# Patient Record
Sex: FEMALE | Race: BLACK OR AFRICAN AMERICAN | NOT HISPANIC OR LATINO | Employment: UNEMPLOYED | URBAN - METROPOLITAN AREA
[De-identification: names, ages, dates, MRNs, and addresses within clinical notes are randomized per-mention and may not be internally consistent; named-entity substitution may affect disease eponyms.]

---

## 2021-11-10 ENCOUNTER — HOSPITAL ENCOUNTER (INPATIENT)
Facility: CLINIC | Age: 21
LOS: 6 days | Discharge: HOME | End: 2021-11-17
Attending: EMERGENCY MEDICINE | Admitting: SURGERY
Payer: COMMERCIAL

## 2021-11-10 DIAGNOSIS — Z20.822 LAB TEST NEGATIVE FOR COVID-19 VIRUS: ICD-10-CM

## 2021-11-10 DIAGNOSIS — R10.9 POSTOPERATIVE ABDOMINAL PAIN: ICD-10-CM

## 2021-11-10 DIAGNOSIS — G89.18 POSTOPERATIVE ABDOMINAL PAIN: ICD-10-CM

## 2021-11-10 PROCEDURE — 96361 HYDRATE IV INFUSION ADD-ON: CPT | Performed by: EMERGENCY MEDICINE

## 2021-11-10 PROCEDURE — 99285 EMERGENCY DEPT VISIT HI MDM: CPT | Mod: 25 | Performed by: EMERGENCY MEDICINE

## 2021-11-10 PROCEDURE — 96374 THER/PROPH/DIAG INJ IV PUSH: CPT | Performed by: EMERGENCY MEDICINE

## 2021-11-10 PROCEDURE — 96375 TX/PRO/DX INJ NEW DRUG ADDON: CPT | Performed by: EMERGENCY MEDICINE

## 2021-11-10 PROCEDURE — 96376 TX/PRO/DX INJ SAME DRUG ADON: CPT | Performed by: EMERGENCY MEDICINE

## 2021-11-10 PROCEDURE — C9803 HOPD COVID-19 SPEC COLLECT: HCPCS | Performed by: EMERGENCY MEDICINE

## 2021-11-10 PROCEDURE — 99284 EMERGENCY DEPT VISIT MOD MDM: CPT | Performed by: EMERGENCY MEDICINE

## 2021-11-10 RX ORDER — HYDROCODONE BITARTRATE AND ACETAMINOPHEN 5; 325 MG/1; MG/1
1 TABLET ORAL EVERY 6 HOURS PRN
COMMUNITY

## 2021-11-10 ASSESSMENT — MIFFLIN-ST. JEOR: SCORE: 1686.22

## 2021-11-11 ENCOUNTER — APPOINTMENT (OUTPATIENT)
Dept: CT IMAGING | Facility: CLINIC | Age: 21
End: 2021-11-11
Attending: EMERGENCY MEDICINE
Payer: COMMERCIAL

## 2021-11-11 ENCOUNTER — APPOINTMENT (OUTPATIENT)
Dept: GENERAL RADIOLOGY | Facility: CLINIC | Age: 21
End: 2021-11-11
Attending: EMERGENCY MEDICINE
Payer: COMMERCIAL

## 2021-11-11 PROBLEM — R10.9 POSTOPERATIVE ABDOMINAL PAIN: Status: ACTIVE | Noted: 2021-11-11

## 2021-11-11 PROBLEM — G89.18 POSTOPERATIVE ABDOMINAL PAIN: Status: ACTIVE | Noted: 2021-11-11

## 2021-11-11 PROBLEM — Z20.822 LAB TEST NEGATIVE FOR COVID-19 VIRUS: Status: ACTIVE | Noted: 2021-11-11

## 2021-11-11 LAB
ALBUMIN SERPL-MCNC: 2.8 G/DL (ref 3.4–5)
ALBUMIN UR-MCNC: 200 MG/DL
ALP SERPL-CCNC: 95 U/L (ref 40–150)
ALT SERPL W P-5'-P-CCNC: 64 U/L (ref 0–50)
ANION GAP SERPL CALCULATED.3IONS-SCNC: 5 MMOL/L (ref 3–14)
APPEARANCE UR: ABNORMAL
AST SERPL W P-5'-P-CCNC: 34 U/L (ref 0–45)
ATRIAL RATE - MUSE: 76 BPM
BASOPHILS # BLD AUTO: 0 10E3/UL (ref 0–0.2)
BASOPHILS NFR BLD AUTO: 0 %
BILIRUB SERPL-MCNC: 0.4 MG/DL (ref 0.2–1.3)
BILIRUB UR QL STRIP: NEGATIVE
BUN SERPL-MCNC: 7 MG/DL (ref 7–30)
CALCIUM SERPL-MCNC: 8.8 MG/DL (ref 8.5–10.1)
CHLORIDE BLD-SCNC: 105 MMOL/L (ref 94–109)
CO2 SERPL-SCNC: 28 MMOL/L (ref 20–32)
COLOR UR AUTO: ABNORMAL
CREAT SERPL-MCNC: 0.74 MG/DL (ref 0.52–1.04)
DIASTOLIC BLOOD PRESSURE - MUSE: NORMAL MMHG
EOSINOPHIL # BLD AUTO: 0 10E3/UL (ref 0–0.7)
EOSINOPHIL NFR BLD AUTO: 0 %
ERYTHROCYTE [DISTWIDTH] IN BLOOD BY AUTOMATED COUNT: 13.9 % (ref 10–15)
GFR SERPL CREATININE-BSD FRML MDRD: >90 ML/MIN/1.73M2
GLUCOSE BLD-MCNC: 104 MG/DL (ref 70–99)
GLUCOSE UR STRIP-MCNC: NEGATIVE MG/DL
HCG SERPL QL: NEGATIVE
HCT VFR BLD AUTO: 37.3 % (ref 35–47)
HGB BLD-MCNC: 12 G/DL (ref 11.7–15.7)
HGB UR QL STRIP: ABNORMAL
IMM GRANULOCYTES # BLD: 0 10E3/UL
IMM GRANULOCYTES NFR BLD: 0 %
INTERPRETATION ECG - MUSE: NORMAL
KETONES UR STRIP-MCNC: 100 MG/DL
LEUKOCYTE ESTERASE UR QL STRIP: ABNORMAL
LIPASE SERPL-CCNC: 232 U/L (ref 73–393)
LYMPHOCYTES # BLD AUTO: 1.5 10E3/UL (ref 0.8–5.3)
LYMPHOCYTES NFR BLD AUTO: 12 %
MCH RBC QN AUTO: 23.7 PG (ref 26.5–33)
MCHC RBC AUTO-ENTMCNC: 32.2 G/DL (ref 31.5–36.5)
MCV RBC AUTO: 74 FL (ref 78–100)
MONOCYTES # BLD AUTO: 0.9 10E3/UL (ref 0–1.3)
MONOCYTES NFR BLD AUTO: 7 %
MUCOUS THREADS #/AREA URNS LPF: PRESENT /LPF
NEUTROPHILS # BLD AUTO: 10 10E3/UL (ref 1.6–8.3)
NEUTROPHILS NFR BLD AUTO: 81 %
NITRATE UR QL: NEGATIVE
NRBC # BLD AUTO: 0 10E3/UL
NRBC BLD AUTO-RTO: 0 /100
P AXIS - MUSE: 68 DEGREES
PH UR STRIP: 6 [PH] (ref 5–7)
PLATELET # BLD AUTO: 385 10E3/UL (ref 150–450)
POTASSIUM BLD-SCNC: 4 MMOL/L (ref 3.4–5.3)
PR INTERVAL - MUSE: 164 MS
PROT SERPL-MCNC: 7.7 G/DL (ref 6.8–8.8)
QRS DURATION - MUSE: 84 MS
QT - MUSE: 384 MS
QTC - MUSE: 432 MS
R AXIS - MUSE: 59 DEGREES
RBC # BLD AUTO: 5.06 10E6/UL (ref 3.8–5.2)
RBC URINE: >182 /HPF
SARS-COV-2 RNA RESP QL NAA+PROBE: NEGATIVE
SODIUM SERPL-SCNC: 138 MMOL/L (ref 133–144)
SP GR UR STRIP: >1.05 (ref 1–1.03)
SQUAMOUS EPITHELIAL: 51 /HPF
SYSTOLIC BLOOD PRESSURE - MUSE: NORMAL MMHG
T AXIS - MUSE: 35 DEGREES
UROBILINOGEN UR STRIP-MCNC: NORMAL MG/DL
VENTRICULAR RATE- MUSE: 76 BPM
WBC # BLD AUTO: 12.4 10E3/UL (ref 4–11)
WBC URINE: >182 /HPF

## 2021-11-11 PROCEDURE — 36415 COLL VENOUS BLD VENIPUNCTURE: CPT | Performed by: EMERGENCY MEDICINE

## 2021-11-11 PROCEDURE — 250N000011 HC RX IP 250 OP 636: Performed by: EMERGENCY MEDICINE

## 2021-11-11 PROCEDURE — 83690 ASSAY OF LIPASE: CPT | Performed by: EMERGENCY MEDICINE

## 2021-11-11 PROCEDURE — 74177 CT ABD & PELVIS W/CONTRAST: CPT

## 2021-11-11 PROCEDURE — 120N000002 HC R&B MED SURG/OB UMMC

## 2021-11-11 PROCEDURE — 71045 X-RAY EXAM CHEST 1 VIEW: CPT | Mod: 26 | Performed by: RADIOLOGY

## 2021-11-11 PROCEDURE — 250N000013 HC RX MED GY IP 250 OP 250 PS 637

## 2021-11-11 PROCEDURE — U0005 INFEC AGEN DETEC AMPLI PROBE: HCPCS | Performed by: EMERGENCY MEDICINE

## 2021-11-11 PROCEDURE — 71045 X-RAY EXAM CHEST 1 VIEW: CPT

## 2021-11-11 PROCEDURE — 250N000009 HC RX 250: Performed by: EMERGENCY MEDICINE

## 2021-11-11 PROCEDURE — G0378 HOSPITAL OBSERVATION PER HR: HCPCS

## 2021-11-11 PROCEDURE — 84703 CHORIONIC GONADOTROPIN ASSAY: CPT | Performed by: EMERGENCY MEDICINE

## 2021-11-11 PROCEDURE — 99222 1ST HOSP IP/OBS MODERATE 55: CPT | Mod: GC | Performed by: SURGERY

## 2021-11-11 PROCEDURE — 74177 CT ABD & PELVIS W/CONTRAST: CPT | Mod: 26 | Performed by: RADIOLOGY

## 2021-11-11 PROCEDURE — 258N000003 HC RX IP 258 OP 636: Performed by: EMERGENCY MEDICINE

## 2021-11-11 PROCEDURE — 87086 URINE CULTURE/COLONY COUNT: CPT | Performed by: EMERGENCY MEDICINE

## 2021-11-11 PROCEDURE — 85025 COMPLETE CBC W/AUTO DIFF WBC: CPT | Performed by: EMERGENCY MEDICINE

## 2021-11-11 PROCEDURE — 80053 COMPREHEN METABOLIC PANEL: CPT | Performed by: EMERGENCY MEDICINE

## 2021-11-11 PROCEDURE — 81001 URINALYSIS AUTO W/SCOPE: CPT | Performed by: EMERGENCY MEDICINE

## 2021-11-11 RX ORDER — ONDANSETRON 2 MG/ML
4 INJECTION INTRAMUSCULAR; INTRAVENOUS EVERY 6 HOURS PRN
Status: DISCONTINUED | OUTPATIENT
Start: 2021-11-11 | End: 2021-11-17 | Stop reason: HOSPADM

## 2021-11-11 RX ORDER — ACETAMINOPHEN 325 MG/1
325 TABLET ORAL EVERY 4 HOURS PRN
Status: DISCONTINUED | OUTPATIENT
Start: 2021-11-11 | End: 2021-11-11

## 2021-11-11 RX ORDER — ONDANSETRON 4 MG/1
4 TABLET, ORALLY DISINTEGRATING ORAL EVERY 6 HOURS PRN
Status: DISCONTINUED | OUTPATIENT
Start: 2021-11-11 | End: 2021-11-17 | Stop reason: HOSPADM

## 2021-11-11 RX ORDER — OXYCODONE HYDROCHLORIDE 5 MG/1
5 TABLET ORAL EVERY 4 HOURS PRN
Status: DISCONTINUED | OUTPATIENT
Start: 2021-11-11 | End: 2021-11-12

## 2021-11-11 RX ORDER — PROCHLORPERAZINE MALEATE 5 MG
10 TABLET ORAL EVERY 6 HOURS PRN
Status: DISCONTINUED | OUTPATIENT
Start: 2021-11-11 | End: 2021-11-17 | Stop reason: HOSPADM

## 2021-11-11 RX ORDER — PROCHLORPERAZINE 25 MG
25 SUPPOSITORY, RECTAL RECTAL EVERY 12 HOURS PRN
Status: DISCONTINUED | OUTPATIENT
Start: 2021-11-11 | End: 2021-11-17 | Stop reason: HOSPADM

## 2021-11-11 RX ORDER — ACETAMINOPHEN 325 MG/1
650 TABLET ORAL EVERY 4 HOURS PRN
Status: DISCONTINUED | OUTPATIENT
Start: 2021-11-11 | End: 2021-11-12

## 2021-11-11 RX ORDER — HYDROMORPHONE HYDROCHLORIDE 1 MG/ML
0.5 INJECTION, SOLUTION INTRAMUSCULAR; INTRAVENOUS; SUBCUTANEOUS
Status: COMPLETED | OUTPATIENT
Start: 2021-11-11 | End: 2021-11-11

## 2021-11-11 RX ORDER — LIDOCAINE 40 MG/G
CREAM TOPICAL
Status: DISCONTINUED | OUTPATIENT
Start: 2021-11-11 | End: 2021-11-17 | Stop reason: HOSPADM

## 2021-11-11 RX ORDER — SODIUM CHLORIDE 9 MG/ML
INJECTION, SOLUTION INTRAVENOUS CONTINUOUS
Status: DISCONTINUED | OUTPATIENT
Start: 2021-11-11 | End: 2021-11-13

## 2021-11-11 RX ORDER — SODIUM CHLORIDE, SODIUM LACTATE, POTASSIUM CHLORIDE, CALCIUM CHLORIDE 600; 310; 30; 20 MG/100ML; MG/100ML; MG/100ML; MG/100ML
INJECTION, SOLUTION INTRAVENOUS CONTINUOUS
Status: DISCONTINUED | OUTPATIENT
Start: 2021-11-11 | End: 2021-11-11

## 2021-11-11 RX ORDER — ONDANSETRON 2 MG/ML
4 INJECTION INTRAMUSCULAR; INTRAVENOUS EVERY 30 MIN PRN
Status: DISCONTINUED | OUTPATIENT
Start: 2021-11-11 | End: 2021-11-17 | Stop reason: HOSPADM

## 2021-11-11 RX ORDER — IOPAMIDOL 755 MG/ML
120 INJECTION, SOLUTION INTRAVASCULAR ONCE
Status: COMPLETED | OUTPATIENT
Start: 2021-11-11 | End: 2021-11-11

## 2021-11-11 RX ADMIN — SODIUM CHLORIDE: 9 INJECTION, SOLUTION INTRAVENOUS at 14:11

## 2021-11-11 RX ADMIN — OXYCODONE HYDROCHLORIDE 5 MG: 5 TABLET ORAL at 20:51

## 2021-11-11 RX ADMIN — SODIUM CHLORIDE 1000 ML: 9 INJECTION, SOLUTION INTRAVENOUS at 00:31

## 2021-11-11 RX ADMIN — SODIUM CHLORIDE: 9 INJECTION, SOLUTION INTRAVENOUS at 22:03

## 2021-11-11 RX ADMIN — HYDROMORPHONE HYDROCHLORIDE 0.5 MG: 1 INJECTION, SOLUTION INTRAMUSCULAR; INTRAVENOUS; SUBCUTANEOUS at 01:43

## 2021-11-11 RX ADMIN — ACETAMINOPHEN 650 MG: 325 TABLET, FILM COATED ORAL at 16:26

## 2021-11-11 RX ADMIN — HYDROMORPHONE HYDROCHLORIDE 0.5 MG: 1 INJECTION, SOLUTION INTRAMUSCULAR; INTRAVENOUS; SUBCUTANEOUS at 00:26

## 2021-11-11 RX ADMIN — HYDROMORPHONE HYDROCHLORIDE 0.5 MG: 1 INJECTION, SOLUTION INTRAMUSCULAR; INTRAVENOUS; SUBCUTANEOUS at 06:15

## 2021-11-11 RX ADMIN — OXYCODONE HYDROCHLORIDE 5 MG: 5 TABLET ORAL at 13:18

## 2021-11-11 RX ADMIN — SODIUM CHLORIDE, PRESERVATIVE FREE 79 ML: 5 INJECTION INTRAVENOUS at 01:45

## 2021-11-11 RX ADMIN — IOPAMIDOL 120 ML: 755 INJECTION, SOLUTION INTRAVENOUS at 01:44

## 2021-11-11 RX ADMIN — ONDANSETRON 4 MG: 2 INJECTION INTRAMUSCULAR; INTRAVENOUS at 00:26

## 2021-11-11 RX ADMIN — SODIUM CHLORIDE: 9 INJECTION, SOLUTION INTRAVENOUS at 06:15

## 2021-11-11 ASSESSMENT — ACTIVITIES OF DAILY LIVING (ADL)
TOILETING_ISSUES: NO
CONCENTRATING,_REMEMBERING_OR_MAKING_DECISIONS_DIFFICULTY: NO
FALL_HISTORY_WITHIN_LAST_SIX_MONTHS: NO
DOING_ERRANDS_INDEPENDENTLY_DIFFICULTY: NO
DIFFICULTY_EATING/SWALLOWING: NO
ADLS_ACUITY_SCORE: 7
WALKING_OR_CLIMBING_STAIRS_DIFFICULTY: NO
DRESSING/BATHING_DIFFICULTY: NO
WEAR_GLASSES_OR_BLIND: NO
ADLS_ACUITY_SCORE: 3
ADLS_ACUITY_SCORE: 3
PATIENT_/_FAMILY_COMMUNICATION_STYLE: SPOKEN LANGUAGE (ENGLISH OR BILINGUAL)
DIFFICULTY_COMMUNICATING: NO
HEARING_DIFFICULTY_OR_DEAF: NO

## 2021-11-11 ASSESSMENT — ENCOUNTER SYMPTOMS
SHORTNESS OF BREATH: 1
BLOOD IN STOOL: 0
NAUSEA: 0
ABDOMINAL PAIN: 1
CHILLS: 1
VOMITING: 0
HEMATURIA: 1

## 2021-11-11 NOTE — DISCHARGE SUMMARY
[facility]  Discharge Summary  Surgery     Malika Stokes MRN# [id number]   YOB: 2000 Age: 20 year old     Date of Admission:  11/10/2021  Date of Discharge::  11/17/2021  Admitting Physician:  Ricardo Carrington MD  Discharge Physician:  XUAN CRUZ MD  Primary Care Physician:        No Ref-Primary, Physician          Admission Diagnoses:   Postoperative abdominal pain [R10.9, G89.18]  Lab test negative for COVID-19 virus [Z20.822]          Discharge Diagnosis:   Postoperative abdominal pain [R10.9, G89.18]  Bile leak [K83.9]         Procedures:   11/13 laparoscopic abdominal washout  11/16 ENDOSCOPIC RETROGRADE CHOLANGIOPANCREATOGRAPHY, WITH BILIARY AND PANCREATIC STENT INSERTION. BILIARY SPHINCTEROTOMY.            Consultations:   VASCULAR ACCESS CARE ADULT IP CONSULT  VASCULAR ACCESS CARE ADULT IP CONSULT  GI PANCREATICOBILIARY ADULT IP CONSULT          Brief History of Illness:   20 year old female with PMH notable for chronic cholecystitis [facility] with recent drain removal (11/10/21) presented with complaints of abdominal pain.              Hospital Course:   The patient was admitted for pain control and observation. Her pain initially improved but was substantially worsened on 11/13. In the setting of worsening abdominal pain with a fever, CT was obtained and showed increased fluid near gallbladder fossa and in pelvis. At that time it was unclear what was causing this as her LFT values were not supportive of a bile leak, her hemoglobin was not decreasing and it would've been too early in her post-operative course for abscess formation. However, given her pain and imaging findings, she was taken back to OR for washout and drain placement, upon which copious bile was found within the abdomen. Post-operatively, her pain improved greatly and her diet was advanced as tolerated. Her drain output decreased  initially, however increased on 11/16 prompting a GI consult for evaluation and ERCP and stenting which was performed on 11/16. Post-procedure, she recovered very well, diet was advanced with no issue. On day of discharge, her pain was well controlled, voiding independently, ambulating independently, tolerating a regular diet.            Imaging Studies:     Results for orders placed or performed during the hospital encounter of 11/10/21   XR Chest Port 1 View    Narrative    EXAM: XR CHEST PORT 1 VIEW  LOCATION: [facility]  DATE/TIME: 11/11/2021 12:08 AM    INDICATION: Shortness of breath.  COMPARISON: None.    FINDINGS: The heart size is normal. Small band of scar or atelectasis in the left mid lung. The lungs are otherwise clear. No pneumothorax.      Impression    IMPRESSION: No acute abnormality.   CT Abdomen Pelvis w Contrast    Narrative    EXAM: CT ABDOMEN PELVIS W CONTRAST  LOCATION: [facility]  DATE/TIME: 11/11/2021 1:29 AM    INDICATION: Abdominal pain, fever, post-op  COMPARISON: None.  TECHNIQUE: CT scan of the abdomen and pelvis was performed following injection of IV contrast. Multiplanar reformats were obtained. Dose reduction techniques were used.  CONTRAST: 120 ml isovue 370     FINDINGS:   LOWER CHEST: Bibasilar atelectasis.    HEPATOBILIARY: Small linear areas of low density within the right liver adjacent to the gallbladder fossa which may be small areas of laceration series 5 images 175-2:15. Cholecystectomy with small amount of air in the gallbladder fossa. Complex density   in the gallbladder fossa series 5 image 2064.3 x 3.7 cm in size. Stranding inferior to the right liver and small amount of fluid adjacent to the liver.    PANCREAS: Normal.    SPLEEN: Normal.    ADRENAL GLANDS: Normal.    KIDNEYS/BLADDER: Normal.    BOWEL: Normal caliber. Wall thickening of the right colon appendix normal.    LYMPH  NODES: Normal.    VASCULATURE: Unremarkable.    PELVIC ORGANS: Moderate amount of pelvic free fluid. Dependent density within the fluid likely a component of hemoperitoneum. 3.8 cm left ovarian cyst.    MUSCULOSKELETAL: Soft tissue emphysema and edema right anterior abdominal wall.      Impression    IMPRESSION:   1.  Recent postsurgical change of cholecystectomy. Complex density in the gallbladder fossa could be postsurgical hematoma. There is a small amount of complex air in the gallbladder fossa likely postsurgical. Close interval follow-up to assess for   resolution and exclude evolving abscess recommended. Small amount of free fluid adjacent to the liver and moderate amount within the pelvis. If there is clinical concern for bile leak then HIDA scan is recommended.  2.  Small linear areas of low-attenuation within the right liver adjacent to the gallbladder fossa likely small areas of laceration related to recent surgery.  3.  Right colonic wall thickening may be secondary inflammation of the colon.  4.  3.8 cm left ovarian cyst.              Medications Prior to Admission:     Medications Prior to Admission   Medication Sig Dispense Refill Last Dose     HYDROcodone-acetaminophen (NORCO) 5-325 MG tablet Take 1 tablet by mouth every 6 hours as needed for severe pain   11/10/2021 at Unknown time              Discharge Medications:     Current Discharge Medication List      CONTINUE these medications which have NOT CHANGED    Details   HYDROcodone-acetaminophen (NORCO) 5-325 MG tablet Take 1 tablet by mouth every 6 hours as needed for severe pain                     Medications Discontinued or Adjusted During This Hospitalization:   No change           Antibiotics Prescribed at Discharge:   None prescribed           Day of Discharge Physical Exam:   Temp:  [97.5  F (36.4  C)-99.7  F (37.6  C)] 97.5  F (36.4  C)  Pulse:  [] 86  Resp:  [18-33] 18  BP: (107-141)/(67-89) 125/89  SpO2:  [95 %-100 %] 99  %    General: awake, alert, no acute distress, laying comfortably in bed   CV: warm, well perfused   Pulm: breathing comfortably on room air   Abdomen: soft, non-distended, non-tender, no rebound or guarding;  Incisions c/d/i. OMAR drain output serosanguinous   Extremities: no edema, moving all extremities spontaneously and without apparent deficit            Final Pathology Result:   None           Discharge Instructions and Follow-Up:     Discharge Procedure Orders   Reason for your hospital stay   Order Comments: Post-op pain after drain removal     Adult [facility] Follow-up and recommended labs and tests   Order Comments: Follow up with primary care provider, within 7 days to evaluate after surgery.  No follow up labs or test are needed.       Follow up with operating surgeon at [facility] in approximately 1 week.     Follow up with [facility] in 3 weeks.     Appointments on [place] and/or [facility] (with [facility] or [facility] provider or service). Call [phone] if you haven't heard regarding these appointments within 7 days of discharge.     Tubes and drains   Order Comments: You are going home with the following tubes or drains: OMAR drain to remain in place until outpatient follow up visit with [facility] surgery.     Wound care and dressings   Order Comments: Okay to shower and gently cleanse wounds. Do not submerge or soak incisions in bathtub, hot tub, or pool for at least 4 weeks.     Activity   Order Comments: Your activity upon discharge: activity as tolerated. Do not lift over 10 pounds for at least 4 weeks.     Order Specific Question Answer Comments   Is discharge order? Yes      Diet   Order Comments: Follow this diet upon discharge: Regular Diet     Order Specific Question Answer Comments   Is discharge order? Yes               Home Health Care:     Not needed           Discharge Disposition:     Discharged to home      Condition at discharge: Stable    Patient was seen, examined, and discussed on  day of discharge with chief resident, who discussed with staff surgeon.    Linda Barron MD  Surgery Resident

## 2021-11-11 NOTE — CONSULTS
Emergency General Surgery Consultation    Malika Stokes  MRN#: [id number]    Date of Admission:  11/10/2021    Date of Consult: 11/11/2021    Reason for consult: Abdominal pain secondary to post-op        Requesting service: ED - Dr. Morris          Assessment and Plan:   Assessment:   20 year old female with PMH notable for chronic cholecystitis s/p lap subtotal cholecystectomy 11/9/21 ([facility], Dr. Beckett) with recent drain removal (11/10/21) presenting to the ED with complaints of abdominal pain likely secondary to residual perioperative hematoma      Plan:   Admission to Observation for pain control / management  ADAT, OK for clears  CBC trending  Multimodal pain control  Anti-emetic protocol  Consider HIDA as needed  EGS will continue to follow    Discussed with chief Dr. Peck, discussed with staff surgeon Dr. Zeb Allen MD  General Surgery, PGY-2  Department of Surgery          Chief Complaint:   Abdominal pain         History of Present Illness:   Malika Stokes is a 20 year old female with PMH notable for chronic cholecystitis s/p lap subtotal cholecystectomy 11/9/21 ([facility] Dr. Sophy Newell) with recent drain removal (11/10/21) presenting to the ED with complaints of abdominal pain and shortness of breath.      Patient states that after her surgery, she had a OMAR drain for fluid drainage, removed yesterday in clinic after it was leaking.  She states that about 1 hour later, she developed increasing abdominal pain, nearly 10/10.  She reports that her abdominal pain is sharp, constant, and worse in the right upper quadrant.  She states this pain is making it difficult to breathe, as well as hurts with any movement as well as worse when she is laying down. Patient denies any nausea or vomiting. Passing flatus, last BM prior to surgery.    Here in the ED, WBC slightly elevated 12.4 (11.1 on 11/1), Hgb 12.0. EKG normal. ALT mildly elevated 64. AST 34. Lipase  232. T Bili 0.4. Alk Sindy 95. Cr 0.74. COVID-19 negative. CT imaging significant for recent postsurgical change of cholecystectomy. Complex density in the gallbladder fossa could be postsurgical hematoma. There is a small amount of complex air in the gallbladder fossa likely postsurgical. Close interval follow-up to assess for resolution and exclude evolving abscess recommended. Small amount of free fluid adjacent to the liver and moderate amount within the pelvis.         Past Medical History:     History reviewed. No pertinent past medical history.          Past Surgical History:     History reviewed. No pertinent surgical history.          Social History:   Tobacco: None  EtOH: None  Other drug use: None  Lives in [place], [place]  Works at [employer]    Social History     Tobacco Use     Smoking status: Never Smoker     Smokeless tobacco: Never Used   Substance Use Topics     Alcohol use: Not Currently             Family History:     Negative for bleeding disorders, clotting disorders, or problems with anesthesia.    History reviewed. No pertinent family history.             Allergies:   No Known Allergies          Medications:     No current facility-administered medications on file prior to encounter.  HYDROcodone-acetaminophen (NORCO) 5-325 MG tablet, Take 1 tablet by mouth every 6 hours as needed for severe pain    Robaxin          Review of Systems:   10-point ROS otherwise negative except as noted above.          Physical Exam:     Temp:  [98.3  F (36.8  C)] 98.3  F (36.8  C)  Pulse:  [84] 84  Resp:  [16] 16  BP: (129)/(77) 129/77  SpO2:  [100 %] 100 %     General: AAOx4, NAD, lying comfortably in bed  CV: regular rate and rhythm, warm, well-perfused  Pulm: no dyspnea, breathing comfortably on RA  Abd: soft, non-distended, TTP RUQ, moderately TTP RLQ, epigastric, incision site clean dry intact  Extremities: no edema  Neuro: moving all extremities spontaneously without apparent deficit    No  intake/output data recorded.          Data:   Labs:  Arterial Blood Gases   No lab results found in last 7 days.     Complete Blood Count   Recent Labs   Lab 11/11/21  0026   WBC 12.4*   HGB 12.0          Basic Metabolic Panel  Recent Labs   Lab 11/11/21 0026      POTASSIUM 4.0   CHLORIDE 105   CO2 28   BUN 7   CR 0.74   *   YOVANI 8.8       Liver Function Tests  Recent Labs   Lab 11/11/21 0026   AST 34   ALT 64*   ALKPHOS 95   BILITOTAL 0.4   ALBUMIN 2.8*       Pancreatic Enzymes  Recent Labs   Lab 11/11/21  0026   LIPASE 232       Coagulation Profile  No lab results found in last 7 days.    Lactate  No lab results found in last 7 days.    Imaging:   Results for orders placed or performed during the hospital encounter of 11/10/21   XR Chest Port 1 View    Narrative    EXAM: XR CHEST PORT 1 VIEW  LOCATION: [facility]  DATE/TIME: 11/11/2021 12:08 AM    INDICATION: Shortness of breath.  COMPARISON: None.    FINDINGS: The heart size is normal. Small band of scar or atelectasis in the left mid lung. The lungs are otherwise clear. No pneumothorax.      Impression    IMPRESSION: No acute abnormality.   CT Abdomen Pelvis w Contrast    Narrative    EXAM: CT ABDOMEN PELVIS W CONTRAST  LOCATION: [facility]  DATE/TIME: 11/11/2021 1:29 AM    INDICATION: Abdominal pain, fever, post-op  COMPARISON: None.  TECHNIQUE: CT scan of the abdomen and pelvis was performed following injection of IV contrast. Multiplanar reformats were obtained. Dose reduction techniques were used.  CONTRAST: 120 ml isovue 370     FINDINGS:   LOWER CHEST: Bibasilar atelectasis.    HEPATOBILIARY: Small linear areas of low density within the right liver adjacent to the gallbladder fossa which may be small areas of laceration series 5 images 175-2:15. Cholecystectomy with small amount of air in the gallbladder fossa. Complex density   in the  gallbladder fossa series 5 image 2064.3 x 3.7 cm in size. Stranding inferior to the right liver and small amount of fluid adjacent to the liver.    PANCREAS: Normal.    SPLEEN: Normal.    ADRENAL GLANDS: Normal.    KIDNEYS/BLADDER: Normal.    BOWEL: Normal caliber. Wall thickening of the right colon appendix normal.    LYMPH NODES: Normal.    VASCULATURE: Unremarkable.    PELVIC ORGANS: Moderate amount of pelvic free fluid. Dependent density within the fluid likely a component of hemoperitoneum. 3.8 cm left ovarian cyst.    MUSCULOSKELETAL: Soft tissue emphysema and edema right anterior abdominal wall.      Impression    IMPRESSION:   1.  Recent postsurgical change of cholecystectomy. Complex density in the gallbladder fossa could be postsurgical hematoma. There is a small amount of complex air in the gallbladder fossa likely postsurgical. Close interval follow-up to assess for   resolution and exclude evolving abscess recommended. Small amount of free fluid adjacent to the liver and moderate amount within the pelvis. If there is clinical concern for bile leak then HIDA scan is recommended.  2.  Small linear areas of low-attenuation within the right liver adjacent to the gallbladder fossa likely small areas of laceration related to recent surgery.  3.  Right colonic wall thickening may be secondary inflammation of the colon.  4.  3.8 cm left ovarian cyst.

## 2021-11-11 NOTE — ED PROVIDER NOTES
"ED Provider Note  [facility]      History     Chief Complaint   Patient presents with     Post-op Problem     The history is provided by the patient and medical records.     Malika Stokes is a 20 year old female with PMH notable for chronic cholecystitis s/p lap cholecystectomy 11/9/21 presenting to the ED with complaints of abdominal pain and shortness of breath.  Patient states that after her surgery, she had a fluid bag to drain blood.  She states that this was taken out earlier today in the clinic.  She states that this is when her pain started.  She reports that her abdominal pain is worse in the right upper quadrant.  She states this pain is making it difficult to breathe.  Patient denies any nausea or vomiting.  Patient denies any blood in her stools.  However, patient did notice some blood in her urine.  Patient reports chills.      Past Medical History  History reviewed. No pertinent past medical history.  History reviewed. No pertinent surgical history.  HYDROcodone-acetaminophen (NORCO) 5-325 MG tablet      No Known Allergies  Family History  History reviewed. No pertinent family history.  Social History   Social History     Tobacco Use     Smoking status: Never Smoker     Smokeless tobacco: Never Used   Substance Use Topics     Alcohol use: Not Currently     Drug use: Not Currently      Past medical history, past surgical history, medications, allergies, family history, and social history were reviewed with the patient. No additional pertinent items.       Review of Systems   Constitutional: Positive for chills.   Respiratory: Positive for shortness of breath.    Gastrointestinal: Positive for abdominal pain (RUQ). Negative for blood in stool, nausea and vomiting.   Genitourinary: Positive for hematuria.     Physical Exam   BP: 129/77  Pulse: 84  Temp: 98.3  F (36.8  C)  Resp: 16  Height: 170.2 cm (5' 7\")  Weight: 89.4 kg (197 lb)  SpO2: 100 %  Physical " Exam  Constitutional:       General: She is not in acute distress.     Appearance: She is not diaphoretic.   HENT:      Head: Normocephalic.      Mouth/Throat:      Pharynx: No oropharyngeal exudate.   Eyes:      Extraocular Movements: Extraocular movements intact.   Cardiovascular:      Rate and Rhythm: Normal rate and regular rhythm.      Heart sounds: Normal heart sounds.   Pulmonary:      Effort: No respiratory distress.      Breath sounds: Normal breath sounds.   Abdominal:      General: There is no distension.      Palpations: Abdomen is soft.      Tenderness: There is abdominal tenderness.      Comments: Diffuse abdominal tenderness   Musculoskeletal:         General: No deformity.      Cervical back: Neck supple.   Skin:     General: Skin is warm and dry.   Neurological:      Mental Status: She is alert.      Comments: alert   Psychiatric:         Behavior: Behavior normal.         ED Course     12:17 AM  The patient was seen and examined by Bennie Morris DO in Room ED20.     Procedures       Results for orders placed or performed during the hospital encounter of 11/10/21   XR Chest Port 1 View     Status: None    Narrative    EXAM: XR CHEST PORT 1 VIEW  LOCATION: [facility]  DATE/TIME: 11/11/2021 12:08 AM    INDICATION: Shortness of breath.  COMPARISON: None.    FINDINGS: The heart size is normal. Small band of scar or atelectasis in the left mid lung. The lungs are otherwise clear. No pneumothorax.      Impression    IMPRESSION: No acute abnormality.   CT Abdomen Pelvis w Contrast     Status: None    Narrative    EXAM: CT ABDOMEN PELVIS W CONTRAST  LOCATION: [facility]  DATE/TIME: 11/11/2021 1:29 AM    INDICATION: Abdominal pain, fever, post-op  COMPARISON: None.  TECHNIQUE: CT scan of the abdomen and pelvis was performed following injection of IV contrast. Multiplanar reformats were obtained. Dose reduction  techniques were used.  CONTRAST: 120 ml isovue 370     FINDINGS:   LOWER CHEST: Bibasilar atelectasis.    HEPATOBILIARY: Small linear areas of low density within the right liver adjacent to the gallbladder fossa which may be small areas of laceration series 5 images 175-2:15. Cholecystectomy with small amount of air in the gallbladder fossa. Complex density   in the gallbladder fossa series 5 image 2064.3 x 3.7 cm in size. Stranding inferior to the right liver and small amount of fluid adjacent to the liver.    PANCREAS: Normal.    SPLEEN: Normal.    ADRENAL GLANDS: Normal.    KIDNEYS/BLADDER: Normal.    BOWEL: Normal caliber. Wall thickening of the right colon appendix normal.    LYMPH NODES: Normal.    VASCULATURE: Unremarkable.    PELVIC ORGANS: Moderate amount of pelvic free fluid. Dependent density within the fluid likely a component of hemoperitoneum. 3.8 cm left ovarian cyst.    MUSCULOSKELETAL: Soft tissue emphysema and edema right anterior abdominal wall.      Impression    IMPRESSION:   1.  Recent postsurgical change of cholecystectomy. Complex density in the gallbladder fossa could be postsurgical hematoma. There is a small amount of complex air in the gallbladder fossa likely postsurgical. Close interval follow-up to assess for   resolution and exclude evolving abscess recommended. Small amount of free fluid adjacent to the liver and moderate amount within the pelvis. If there is clinical concern for bile leak then HIDA scan is recommended.  2.  Small linear areas of low-attenuation within the right liver adjacent to the gallbladder fossa likely small areas of laceration related to recent surgery.  3.  Right colonic wall thickening may be secondary inflammation of the colon.  4.  3.8 cm left ovarian cyst.   Comprehensive metabolic panel     Status: Abnormal   Result Value Ref Range    Sodium 138 133 - 144 mmol/L    Potassium 4.0 3.4 - 5.3 mmol/L    Chloride 105 94 - 109 mmol/L    Carbon Dioxide (CO2) 28  20 - 32 mmol/L    Anion Gap 5 3 - 14 mmol/L    Urea Nitrogen 7 7 - 30 mg/dL    Creatinine 0.74 0.52 - 1.04 mg/dL    Calcium 8.8 8.5 - 10.1 mg/dL    Glucose 104 (H) 70 - 99 mg/dL    Alkaline Phosphatase 95 40 - 150 U/L    AST 34 0 - 45 U/L    ALT 64 (H) 0 - 50 U/L    Protein Total 7.7 6.8 - 8.8 g/dL    Albumin 2.8 (L) 3.4 - 5.0 g/dL    Bilirubin Total 0.4 0.2 - 1.3 mg/dL    GFR Estimate >90 >60 mL/min/1.73m2   Lipase     Status: Normal   Result Value Ref Range    Lipase 232 73 - 393 U/L   HCG qualitative Blood     Status: Normal   Result Value Ref Range    hCG Serum Qualitative Negative Negative   CBC with platelets and differential     Status: Abnormal   Result Value Ref Range    WBC Count 12.4 (H) 4.0 - 11.0 10e3/uL    RBC Count 5.06 3.80 - 5.20 10e6/uL    Hemoglobin 12.0 11.7 - 15.7 g/dL    Hematocrit 37.3 35.0 - 47.0 %    MCV 74 (L) 78 - 100 fL    MCH 23.7 (L) 26.5 - 33.0 pg    MCHC 32.2 31.5 - 36.5 g/dL    RDW 13.9 10.0 - 15.0 %    Platelet Count 385 150 - 450 10e3/uL    % Neutrophils 81 %    % Lymphocytes 12 %    % Monocytes 7 %    % Eosinophils 0 %    % Basophils 0 %    % Immature Granulocytes 0 %    NRBCs per 100 WBC 0 <1 /100    Absolute Neutrophils 10.0 (H) 1.6 - 8.3 10e3/uL    Absolute Lymphocytes 1.5 0.8 - 5.3 10e3/uL    Absolute Monocytes 0.9 0.0 - 1.3 10e3/uL    Absolute Eosinophils 0.0 0.0 - 0.7 10e3/uL    Absolute Basophils 0.0 0.0 - 0.2 10e3/uL    Absolute Immature Granulocytes 0.0 <=0.0 10e3/uL    Absolute NRBCs 0.0 10e3/uL   EKG 12-lead, tracing only     Status: None (Preliminary result)   Result Value Ref Range    Systolic Blood Pressure  mmHg    Diastolic Blood Pressure  mmHg    Ventricular Rate 76 BPM    Atrial Rate 76 BPM    NE Interval 164 ms    QRS Duration 84 ms     ms    QTc 432 ms    P Axis 68 degrees    R AXIS 59 degrees    T Axis 35 degrees    Interpretation ECG       Sinus rhythm with marked sinus arrhythmia  Otherwise normal ECG     CBC with platelets differential     Status:  Abnormal    Narrative    The following orders were created for panel order CBC with platelets differential.  Procedure                               Abnormality         Status                     ---------                               -----------         ------                     CBC with platelets and d...[[id number]]  Abnormal            Final result                 Please view results for these tests on the individual orders.            Results for orders placed or performed during the hospital encounter of 11/10/21   XR Chest Port 1 View     Status: None    Narrative    EXAM: XR CHEST PORT 1 VIEW  LOCATION: [facility]  DATE/TIME: 11/11/2021 12:08 AM    INDICATION: Shortness of breath.  COMPARISON: None.    FINDINGS: The heart size is normal. Small band of scar or atelectasis in the left mid lung. The lungs are otherwise clear. No pneumothorax.      Impression    IMPRESSION: No acute abnormality.   CT Abdomen Pelvis w Contrast     Status: None    Narrative    EXAM: CT ABDOMEN PELVIS W CONTRAST  LOCATION: [facility]  DATE/TIME: 11/11/2021 1:29 AM    INDICATION: Abdominal pain, fever, post-op  COMPARISON: None.  TECHNIQUE: CT scan of the abdomen and pelvis was performed following injection of IV contrast. Multiplanar reformats were obtained. Dose reduction techniques were used.  CONTRAST: 120 ml isovue 370     FINDINGS:   LOWER CHEST: Bibasilar atelectasis.    HEPATOBILIARY: Small linear areas of low density within the right liver adjacent to the gallbladder fossa which may be small areas of laceration series 5 images 175-2:15. Cholecystectomy with small amount of air in the gallbladder fossa. Complex density   in the gallbladder fossa series 5 image 2064.3 x 3.7 cm in size. Stranding inferior to the right liver and small amount of fluid adjacent to the liver.    PANCREAS: Normal.    SPLEEN: Normal.    ADRENAL GLANDS:  Normal.    KIDNEYS/BLADDER: Normal.    BOWEL: Normal caliber. Wall thickening of the right colon appendix normal.    LYMPH NODES: Normal.    VASCULATURE: Unremarkable.    PELVIC ORGANS: Moderate amount of pelvic free fluid. Dependent density within the fluid likely a component of hemoperitoneum. 3.8 cm left ovarian cyst.    MUSCULOSKELETAL: Soft tissue emphysema and edema right anterior abdominal wall.      Impression    IMPRESSION:   1.  Recent postsurgical change of cholecystectomy. Complex density in the gallbladder fossa could be postsurgical hematoma. There is a small amount of complex air in the gallbladder fossa likely postsurgical. Close interval follow-up to assess for   resolution and exclude evolving abscess recommended. Small amount of free fluid adjacent to the liver and moderate amount within the pelvis. If there is clinical concern for bile leak then HIDA scan is recommended.  2.  Small linear areas of low-attenuation within the right liver adjacent to the gallbladder fossa likely small areas of laceration related to recent surgery.  3.  Right colonic wall thickening may be secondary inflammation of the colon.  4.  3.8 cm left ovarian cyst.   Comprehensive metabolic panel     Status: Abnormal   Result Value Ref Range    Sodium 138 133 - 144 mmol/L    Potassium 4.0 3.4 - 5.3 mmol/L    Chloride 105 94 - 109 mmol/L    Carbon Dioxide (CO2) 28 20 - 32 mmol/L    Anion Gap 5 3 - 14 mmol/L    Urea Nitrogen 7 7 - 30 mg/dL    Creatinine 0.74 0.52 - 1.04 mg/dL    Calcium 8.8 8.5 - 10.1 mg/dL    Glucose 104 (H) 70 - 99 mg/dL    Alkaline Phosphatase 95 40 - 150 U/L    AST 34 0 - 45 U/L    ALT 64 (H) 0 - 50 U/L    Protein Total 7.7 6.8 - 8.8 g/dL    Albumin 2.8 (L) 3.4 - 5.0 g/dL    Bilirubin Total 0.4 0.2 - 1.3 mg/dL    GFR Estimate >90 >60 mL/min/1.73m2   Lipase     Status: Normal   Result Value Ref Range    Lipase 232 73 - 393 U/L   HCG qualitative Blood     Status: Normal   Result Value Ref Range    hCG Serum  Qualitative Negative Negative   CBC with platelets and differential     Status: Abnormal   Result Value Ref Range    WBC Count 12.4 (H) 4.0 - 11.0 10e3/uL    RBC Count 5.06 3.80 - 5.20 10e6/uL    Hemoglobin 12.0 11.7 - 15.7 g/dL    Hematocrit 37.3 35.0 - 47.0 %    MCV 74 (L) 78 - 100 fL    MCH 23.7 (L) 26.5 - 33.0 pg    MCHC 32.2 31.5 - 36.5 g/dL    RDW 13.9 10.0 - 15.0 %    Platelet Count 385 150 - 450 10e3/uL    % Neutrophils 81 %    % Lymphocytes 12 %    % Monocytes 7 %    % Eosinophils 0 %    % Basophils 0 %    % Immature Granulocytes 0 %    NRBCs per 100 WBC 0 <1 /100    Absolute Neutrophils 10.0 (H) 1.6 - 8.3 10e3/uL    Absolute Lymphocytes 1.5 0.8 - 5.3 10e3/uL    Absolute Monocytes 0.9 0.0 - 1.3 10e3/uL    Absolute Eosinophils 0.0 0.0 - 0.7 10e3/uL    Absolute Basophils 0.0 0.0 - 0.2 10e3/uL    Absolute Immature Granulocytes 0.0 <=0.0 10e3/uL    Absolute NRBCs 0.0 10e3/uL   EKG 12-lead, tracing only     Status: None (Preliminary result)   Result Value Ref Range    Systolic Blood Pressure  mmHg    Diastolic Blood Pressure  mmHg    Ventricular Rate 76 BPM    Atrial Rate 76 BPM    MO Interval 164 ms    QRS Duration 84 ms     ms    QTc 432 ms    P Axis 68 degrees    R AXIS 59 degrees    T Axis 35 degrees    Interpretation ECG       Sinus rhythm with marked sinus arrhythmia  Otherwise normal ECG     CBC with platelets differential     Status: Abnormal    Narrative    The following orders were created for panel order CBC with platelets differential.  Procedure                               Abnormality         Status                     ---------                               -----------         ------                     CBC with platelets and d...[[id number]]  Abnormal            Final result                 Please view results for these tests on the individual orders.     Medications   ondansetron (ZOFRAN) injection 4 mg (4 mg Intravenous Given 11/11/21 0026)   HYDROmorphone (PF) (DILAUDID) injection 0.5 mg  (0.5 mg Intravenous Given 11/11/21 0143)   0.9% sodium chloride BOLUS (1,000 mLs Intravenous New Bag 11/11/21 0031)     Followed by   sodium chloride 0.9% infusion ( Intravenous Canceled Entry 11/11/21 0300)   iopamidol (ISOVUE-370) solution 120 mL (120 mLs Intravenous Given 11/11/21 0144)   sodium chloride 0.9 % bag 500mL for CT scan flush use (79 mLs Intravenous Given 11/11/21 0145)        Assessments & Plan (with Medical Decision Making)   20-year-old female presents to us 1 day status post cholecystectomy with abdominal pain.  Differential includes but not limited to postop infection, postoperative bleeding, postoperative pain, abscess.  Patient had a mild elevation in white count of 12.4.  Labs are otherwise nonconcerning.  CT scan showed evidence of the recent surgery with some possible areas of hematoma.  Consult to general surgery.  They evaluate the patient in the emergency department and will admit the patient to observation status.    I have reviewed the nursing notes. I have reviewed the findings, diagnosis, plan and need for follow up with the patient.    New Prescriptions    No medications on file       Final diagnoses:   Postoperative abdominal pain   IDaron, am serving as a trained medical scribe to document services personally performed by Bennie Morris DO, based on the provider's statements to me.  IBennie DO, was physically present and have reviewed and verified the accuracy of this note documented by Daron Valera.    --  [facility] EMERGENCY DEPARTMENT  11/10/2021     Bennie Morris DO  11/11/21 0585

## 2021-11-11 NOTE — PLAN OF CARE
"Time: 3815-2088    Reason for admission: abdominal pain  Vitals: /79 (BP Location: Right arm)   Pulse 90   Temp 99.5  F (37.5  C) (Oral)   Resp 18   Ht 1.702 m (5' 7\")   Wt 89.4 kg (197 lb)   SpO2 100%   BMI 30.85 kg/m      Activity: independent  Pain: constant, abdominal pain  Neuro: WDL  Cardiac: WDL  Respiratory: WDL  GI/: nausea intermittent  Diet: Regular (after tolerating clear liquids)  Lines: L PIV, infusing  Skin/Wounds: skin is CDI  Labs/Imaging: none this shift    New changes this shift: advanced to regular diet    Plan:  Continue to monitor and follow POC    "

## 2021-11-11 NOTE — ED TRIAGE NOTES
"Pt comes in with abdominal pain and trouble breathing, had gallbladder surgery at [facility] yesterday. Pain started today after \"they pulled my drainage tube.\"  "

## 2021-11-11 NOTE — UTILIZATION REVIEW
"Admission Status; Secondary Review Determination     Under the authority of the Utilization Management Committee, the utilization review process indicated a secondary review on the above patient.  The review outcome is based on review of the medical records, discussions with staff, and applying clinical experience noted on the date of the review.       (x) Observation Status Appropriate - This patient does not meet hospital inpatient criteria and is placed in observation status. If this patient's primary payer is Medicare and was admitted as an inpatient, Condition Code 44 should be used and patient status changed to \"observation\".     RATIONALE FOR DETERMINATION: 20-year-old female with history of chronic cholecystitis status post laparoscopic subtotal cholecystectomy on 11/9/2021 at [facility] surgical center with recent drain removal on the day of presentation now complains of increased abdominal pain most likely secondary to residual perioperative hematoma. Observation care appropriate for initial pain control, antiemetics and careful progression of diet.       The severity of illness, intensity of service provided, expected LOS and risk for adverse outcome make the care appropriate for further observation; however, doesn't meet criteria for hospital inpatient admission. This was discussed with attending physician who concurred with this determination.    The information on this document is developed by the utilization review team in order for the business office to ensure compliance.  This only denotes the appropriateness of proper admission status and does not reflect the quality of care rendered.         The definitions of Inpatient Status and Observation Status used in making the determination above are those provided in the CMS Coverage Manual, Chapter 1 and Chapter 6, section 70.4.      Sincerely,     Jorje Dubois MD    Physician Advisor  Utilization Review/ Case Management  [facility].    "

## 2021-11-11 NOTE — PROGRESS NOTES
1330-[facility] belongings at bedside. A&Ox4. VSS on RA. Pt sleeping. IV infusing NS @ 125ml/hr. Clear liquid ADAT, if tolerates regular diet possible discharge later this evening. Continue to monitor and follow POC.

## 2021-11-12 LAB
BACTERIA UR CULT: NORMAL
ERYTHROCYTE [DISTWIDTH] IN BLOOD BY AUTOMATED COUNT: 13.7 % (ref 10–15)
HCT VFR BLD AUTO: 32.8 % (ref 35–47)
HGB BLD-MCNC: 10.6 G/DL (ref 11.7–15.7)
MCH RBC QN AUTO: 23.6 PG (ref 26.5–33)
MCHC RBC AUTO-ENTMCNC: 32.3 G/DL (ref 31.5–36.5)
MCV RBC AUTO: 73 FL (ref 78–100)
PLATELET # BLD AUTO: 337 10E3/UL (ref 150–450)
RBC # BLD AUTO: 4.5 10E6/UL (ref 3.8–5.2)
WBC # BLD AUTO: 10.9 10E3/UL (ref 4–11)

## 2021-11-12 PROCEDURE — 250N000011 HC RX IP 250 OP 636: Performed by: STUDENT IN AN ORGANIZED HEALTH CARE EDUCATION/TRAINING PROGRAM

## 2021-11-12 PROCEDURE — 250N000013 HC RX MED GY IP 250 OP 250 PS 637

## 2021-11-12 PROCEDURE — 120N000002 HC R&B MED SURG/OB UMMC

## 2021-11-12 PROCEDURE — 36415 COLL VENOUS BLD VENIPUNCTURE: CPT | Performed by: STUDENT IN AN ORGANIZED HEALTH CARE EDUCATION/TRAINING PROGRAM

## 2021-11-12 PROCEDURE — G0378 HOSPITAL OBSERVATION PER HR: HCPCS

## 2021-11-12 PROCEDURE — 258N000003 HC RX IP 258 OP 636: Performed by: EMERGENCY MEDICINE

## 2021-11-12 PROCEDURE — 85048 AUTOMATED LEUKOCYTE COUNT: CPT | Performed by: STUDENT IN AN ORGANIZED HEALTH CARE EDUCATION/TRAINING PROGRAM

## 2021-11-12 RX ORDER — ACETAMINOPHEN 325 MG/1
975 TABLET ORAL 4 TIMES DAILY
Status: DISCONTINUED | OUTPATIENT
Start: 2021-11-12 | End: 2021-11-17 | Stop reason: HOSPADM

## 2021-11-12 RX ORDER — OXYCODONE HYDROCHLORIDE 5 MG/1
5 TABLET ORAL ONCE
Status: COMPLETED | OUTPATIENT
Start: 2021-11-12 | End: 2021-11-12

## 2021-11-12 RX ORDER — NALOXONE HYDROCHLORIDE 0.4 MG/ML
0.2 INJECTION, SOLUTION INTRAMUSCULAR; INTRAVENOUS; SUBCUTANEOUS
Status: DISCONTINUED | OUTPATIENT
Start: 2021-11-12 | End: 2021-11-17 | Stop reason: HOSPADM

## 2021-11-12 RX ORDER — HYDROXYZINE HYDROCHLORIDE 25 MG/1
25 TABLET, FILM COATED ORAL EVERY 6 HOURS PRN
Status: DISCONTINUED | OUTPATIENT
Start: 2021-11-12 | End: 2021-11-17 | Stop reason: HOSPADM

## 2021-11-12 RX ORDER — METHOCARBAMOL 500 MG/1
500 TABLET, FILM COATED ORAL 4 TIMES DAILY PRN
Status: DISCONTINUED | OUTPATIENT
Start: 2021-11-12 | End: 2021-11-17 | Stop reason: HOSPADM

## 2021-11-12 RX ORDER — METHOCARBAMOL 500 MG/1
500 TABLET, FILM COATED ORAL 4 TIMES DAILY
Status: DISCONTINUED | OUTPATIENT
Start: 2021-11-12 | End: 2021-11-12

## 2021-11-12 RX ORDER — NALOXONE HYDROCHLORIDE 0.4 MG/ML
0.4 INJECTION, SOLUTION INTRAMUSCULAR; INTRAVENOUS; SUBCUTANEOUS
Status: DISCONTINUED | OUTPATIENT
Start: 2021-11-12 | End: 2021-11-17 | Stop reason: HOSPADM

## 2021-11-12 RX ORDER — IBUPROFEN 400 MG/1
400 TABLET, FILM COATED ORAL EVERY 6 HOURS PRN
Status: DISCONTINUED | OUTPATIENT
Start: 2021-11-12 | End: 2021-11-17 | Stop reason: HOSPADM

## 2021-11-12 RX ORDER — OXYCODONE HYDROCHLORIDE 5 MG/1
5-10 TABLET ORAL EVERY 4 HOURS PRN
Status: DISCONTINUED | OUTPATIENT
Start: 2021-11-12 | End: 2021-11-14

## 2021-11-12 RX ORDER — BISACODYL 10 MG
10 SUPPOSITORY, RECTAL RECTAL DAILY PRN
Status: DISCONTINUED | OUTPATIENT
Start: 2021-11-12 | End: 2021-11-17 | Stop reason: HOSPADM

## 2021-11-12 RX ORDER — HYDROXYZINE HYDROCHLORIDE 25 MG/1
50 TABLET, FILM COATED ORAL EVERY 6 HOURS PRN
Status: DISCONTINUED | OUTPATIENT
Start: 2021-11-12 | End: 2021-11-17 | Stop reason: HOSPADM

## 2021-11-12 RX ORDER — POLYETHYLENE GLYCOL 3350 17 G/17G
17 POWDER, FOR SOLUTION ORAL 2 TIMES DAILY
Status: DISCONTINUED | OUTPATIENT
Start: 2021-11-12 | End: 2021-11-17 | Stop reason: HOSPADM

## 2021-11-12 RX ADMIN — OXYCODONE HYDROCHLORIDE 10 MG: 5 TABLET ORAL at 16:27

## 2021-11-12 RX ADMIN — ACETAMINOPHEN 975 MG: 325 TABLET, FILM COATED ORAL at 16:27

## 2021-11-12 RX ADMIN — SODIUM CHLORIDE: 9 INJECTION, SOLUTION INTRAVENOUS at 18:02

## 2021-11-12 RX ADMIN — ACETAMINOPHEN 975 MG: 325 TABLET, FILM COATED ORAL at 08:00

## 2021-11-12 RX ADMIN — OXYCODONE HYDROCHLORIDE 5 MG: 5 TABLET ORAL at 03:39

## 2021-11-12 RX ADMIN — OXYCODONE HYDROCHLORIDE 10 MG: 5 TABLET ORAL at 11:55

## 2021-11-12 RX ADMIN — ACETAMINOPHEN 975 MG: 325 TABLET, FILM COATED ORAL at 11:55

## 2021-11-12 RX ADMIN — SODIUM CHLORIDE: 9 INJECTION, SOLUTION INTRAVENOUS at 05:47

## 2021-11-12 RX ADMIN — IBUPROFEN 400 MG: 200 TABLET, FILM COATED ORAL at 10:14

## 2021-11-12 RX ADMIN — OXYCODONE HYDROCHLORIDE 5 MG: 5 TABLET ORAL at 01:37

## 2021-11-12 RX ADMIN — OXYCODONE HYDROCHLORIDE 10 MG: 5 TABLET ORAL at 08:00

## 2021-11-12 RX ADMIN — MAGNESIUM HYDROXIDE 30 ML: 400 SUSPENSION ORAL at 08:00

## 2021-11-12 RX ADMIN — BISACODYL 10 MG: 10 SUPPOSITORY RECTAL at 16:27

## 2021-11-12 RX ADMIN — OXYCODONE HYDROCHLORIDE 10 MG: 5 TABLET ORAL at 21:19

## 2021-11-12 RX ADMIN — POLYETHYLENE GLYCOL 3350 17 G: 17 POWDER, FOR SOLUTION ORAL at 21:21

## 2021-11-12 RX ADMIN — ACETAMINOPHEN 975 MG: 325 TABLET, FILM COATED ORAL at 21:18

## 2021-11-12 RX ADMIN — ONDANSETRON 4 MG: 2 INJECTION INTRAMUSCULAR; INTRAVENOUS at 01:55

## 2021-11-12 RX ADMIN — ACETAMINOPHEN 650 MG: 325 TABLET, FILM COATED ORAL at 01:37

## 2021-11-12 NOTE — PROGRESS NOTES
Observation Goals    Pulm: sating 95%> on RA MET  GI: Needs to tolerate regular diet. Nausea needs to resolve. Needs to have bowel function (passing gas or BM)   If not tolerating diet, go NPO. Partially Met: Tolerating diet so far - has poor appetite. No nausea. No BM yet  : To Void Spontaneously MET  MSK: Ambulate well Not Met: has been up to commode, otherwise not OOB

## 2021-11-12 NOTE — PROGRESS NOTES
Observation Goals    Pulm: sating 95%> on RA MET  GI: Needs to tolerate regular diet. Nausea needs to resolve. Needs to have bowel function (passing gas or BM)   If not tolerating diet, go NPO. NOT MET No appetite, c/o nausea & spit up IV zofran given, no BM since prior to surgery & hypoactive BS/ no gas  : To Void Spontaneously MET  MSK: Ambulate well NOT MET:has been up to commode, otherwise not OOB, pt crying when getting up to dt pain    General Surgery Intern, Sissy, paged for more pain meds per pt request.  Pt c/o 8/10 pain, in tears & BP elevated.

## 2021-11-12 NOTE — PROGRESS NOTES
Observation Goals    Pulm: sating 95%> on RA MET  GI: Needs to tolerate regular diet. Nausea needs to resolve. Needs to have bowel function (passing gas or BM)   If not tolerating diet, go NPO. Partially Met: Tolerating diet so far - has poor appetite. No nausea. No BM yet  : To Void Spontaneously MET  MSK: Ambulate well Not Met: has been up to bathroom, otherwise not OOB

## 2021-11-12 NOTE — PROGRESS NOTES
Surgery Progress Note  11/12/2021       Subjective:  Pain and nausea throughout yesterday and overnight. Relief with prns. Not tolerating much clears. Seems to be feeling slightly better this am. No BMs.      Objective:  Temp:  [96.5  F (35.8  C)-100.4  F (38  C)] 98.8  F (37.1  C)  Pulse:  [90-98] 92  Resp:  [18-20] 18  BP: (120-140)/(69-92) 120/69  SpO2:  [97 %-100 %] 100 %    I/O last 3 completed shifts:  In: 2218.75 [I.V.:2218.75]  Out: 200 [Urine:200]      Gen: Awake, alert, NAD  Resp: NLB on RA  Abd: soft, nondistended, mildly tender  Incision: c/d/i  Ext: WWP, no edema     Labs:  Recent Labs   Lab 11/12/21  0725 11/11/21  0026   WBC 10.9 12.4*   HGB 10.6* 12.0    385       Recent Labs   Lab 11/11/21  0026      POTASSIUM 4.0   CHLORIDE 105   CO2 28   BUN 7   CR 0.74   *   YOVANI 8.8       Imaging:  CT on admission reviewed.      Assessment/Plan:   20 year old female s/p subtotal johnnie 11/9 with Dr. Beckett at [facility]. Drain removal on 11/10/21 prompted abdominal pain and nausea which brought her to the ED. Imaging at that time showed small fluid collection, possibly hematoma vs expected gallbladder contents s/p subtotal. Would not expect abscess formation this early postoperatively. Clinical status and labs appear to be improving. Discussed with patient and she was agreeable to seeing how the day goes and potentially going home later today. Likely just needs increased pain regimen from her procedure and time to improve.   - Pain regimen   -- DULCE tylenol 975 qid   -- PRN oxy 5-10 q4, robaxin, atarax, ibuprofen  - MOM today, continue scheduled taylor/senna  - Regular diet  - Activity as tolerated  - Discharge home later today if tolerating diet and pain under control     Seen, examined, and discussed with chief resident, who will discuss with staff.  - - - - - - - - - - - - - - - - - -  Gurvinder Heller MD  Surgery Resident

## 2021-11-12 NOTE — UTILIZATION REVIEW
Concurrent stay review; Secondary Review Determination     Under the authority of the Utilization Management Committee, the utilization review process indicated a secondary review on the above patient.  The review outcome is based on review of the medical records, discussions with staff, and applying clinical experience noted on the date of the review.          (x) Observation Status Appropriate - Concurrent stay review    RATIONALE FOR DETERMINATION   21 yo female with chronic cholecystitis now s/p laparoscopic subtotal cholecystectomy on 11/9/2021 with drain placement who returned for abdominal pain.  Imaging showed probable perioperative hematoma.  She has been advancing her diet and has had pain controlled on oral medications.  Anticipate discharge later today or in the morning.    Patient is clinically improving and there is no clear indication to change patient's status to inpatient. The severity of illness, intensity of service provided, expected LOS and risk for adverse outcome make the care appropriate for observation.    This document was produced using voice recognition software     The information on this document is developed by the utilization review team in order for the business office to ensure compliance.  This only denotes the appropriateness of proper admission status and does not reflect the quality of care rendered.         The definitions of Inpatient Status and Observation Status used in making the determination above are those provided in the CMS Coverage Manual, Chapter 1 and Chapter 6, section 70.4.      Sincerely,   Pamela Bustamante MD  Utilization Review  Physician Advisor  [facility].

## 2021-11-12 NOTE — PLAN OF CARE
"Time 3366-1701     Reason for admission:   Postoperative abdominal pain [R10.9, G89.18]  Lab test negative for COVID-19 virus [Z20.822]     Vitals: /82 (BP Location: Right arm)   Pulse 95   Temp 97.6  F (36.4  C) (Oral)   Resp 16   Ht 1.702 m (5' 7\")   Wt 89.4 kg (197 lb)   SpO2 98%   BMI 30.85 kg/m       Activity: Up SBA/ Independently in room   Pain: Reports pain in upper and right abdomen, tender, PRN oxycodone, tylenol and Ibuprofen given   Neuro: A&Ox4  Cardiac: WDl   Respiratory: WDL on RA   GI/: Milk of mag and PRN Dulcolax suppository given, No BM, Abdominal pain increased with PO intake   Diet: Regular, ok appetite   Lines: L PIV running NS @125  Skin/Wounds: WDL abdominal incisions and drain site, drain site dressing changed     Plan:     Continue to monitor, control pain and follow POC    Observation Goals    Pulm: sating 95%> on RA MET  GI: Needs to tolerate regular diet. Nausea needs to resolve. Needs to have bowel function (passing gas or BM)   If not tolerating diet, go NPO. Not MET Tolerated breakfast but had increased abdominal pain after, no BM since prior to surgery & hypoactive BS/ no gas  : To Void Spontaneously MET  MSK: Ambulate well NOT MET: Up to bathroom and showered independently   "

## 2021-11-12 NOTE — PRE-PROCEDURE
Observation Goals    Pulm: sating 95%> on RA MET  GI: Needs to tolerate regular diet. Nausea needs to resolve. Needs to have bowel function (passing gas or BM)   If not tolerating diet, go NPO. NOT MET No appetite, c/o nausea & spit up IV zofran given, no BM since prior to surgery & hypoactive BS/ no gas  : To Void Spontaneously MET  MSK: Ambulate well NOT MET:has been up to commode, otherwise not OOB, pt crying when getting up to dt pain    VSS on RA ex hypertensive when in pain.  A&Ox4, pt tearful overnight dt pain.  Gen Surg paged & put in 1x dose 5mg oxy, given & pt had relief.  Pain in upper L abd radiating to the R side & back.  Cold applied per pt request & PRN oxy & tylenol given when available.  Pain increased w/ activity, pt became nauseas getting up to commode- PRN IV zofran given w/ relief.  Tolerating sips of fluid but not eating anything overnight.  L PIV infusing NS @ 125 ml/hr.  Void w/o difficulty. Pt has menses.  No BM since prior to surgery, BS hypoactive & not passing gas.  Pt interested in trying stool softener.  RN encouraged pt try to eat food this AM & go for a walk.  Continue to monitor & follow POC.

## 2021-11-12 NOTE — PROGRESS NOTES
Observation Goals    Pulm: sating 95%> on RA MET  GI: Needs to tolerate regular diet. Nausea needs to resolve. Needs to have bowel function (passing gas or BM)   If not tolerating diet, go NPO. Not MET Tolerated breakfast but had increased abdominal pain after, no BM since prior to surgery & hypoactive BS/ no gas  : To Void Spontaneously MET  MSK: Ambulate well NOT MET: Up to bathroom and showered independently

## 2021-11-13 ENCOUNTER — ANESTHESIA EVENT (OUTPATIENT)
Dept: SURGERY | Facility: CLINIC | Age: 21
End: 2021-11-13
Payer: COMMERCIAL

## 2021-11-13 ENCOUNTER — APPOINTMENT (OUTPATIENT)
Dept: CT IMAGING | Facility: CLINIC | Age: 21
End: 2021-11-13
Payer: COMMERCIAL

## 2021-11-13 ENCOUNTER — ANESTHESIA (OUTPATIENT)
Dept: SURGERY | Facility: CLINIC | Age: 21
End: 2021-11-13
Payer: COMMERCIAL

## 2021-11-13 LAB
ABO/RH(D): NORMAL
ABO/RH(D): NORMAL
ALBUMIN SERPL-MCNC: 2.3 G/DL (ref 3.4–5)
ALP SERPL-CCNC: 227 U/L (ref 40–150)
ALT SERPL W P-5'-P-CCNC: 98 U/L (ref 0–50)
ANION GAP SERPL CALCULATED.3IONS-SCNC: 4 MMOL/L (ref 3–14)
ANTIBODY SCREEN: NEGATIVE
AST SERPL W P-5'-P-CCNC: 81 U/L (ref 0–45)
BILIRUB SERPL-MCNC: 0.8 MG/DL (ref 0.2–1.3)
BUN SERPL-MCNC: 6 MG/DL (ref 7–30)
CALCIUM SERPL-MCNC: 8.2 MG/DL (ref 8.5–10.1)
CHLORIDE BLD-SCNC: 107 MMOL/L (ref 94–109)
CO2 SERPL-SCNC: 26 MMOL/L (ref 20–32)
CREAT SERPL-MCNC: 0.54 MG/DL (ref 0.52–1.04)
CRP SERPL-MCNC: 200 MG/L (ref 0–8)
ERYTHROCYTE [DISTWIDTH] IN BLOOD BY AUTOMATED COUNT: 13.6 % (ref 10–15)
GFR SERPL CREATININE-BSD FRML MDRD: >90 ML/MIN/1.73M2
GLUCOSE BLD-MCNC: 114 MG/DL (ref 70–99)
GLUCOSE BLDC GLUCOMTR-MCNC: 102 MG/DL (ref 70–99)
GRAM STAIN RESULT: NORMAL
HCT VFR BLD AUTO: 35.6 % (ref 35–47)
HGB BLD-MCNC: 11.5 G/DL (ref 11.7–15.7)
LACTATE SERPL-SCNC: 1 MMOL/L (ref 0.7–2)
LIPASE SERPL-CCNC: 216 U/L (ref 73–393)
MCH RBC QN AUTO: 23.6 PG (ref 26.5–33)
MCHC RBC AUTO-ENTMCNC: 32.3 G/DL (ref 31.5–36.5)
MCV RBC AUTO: 73 FL (ref 78–100)
PLATELET # BLD AUTO: 447 10E3/UL (ref 150–450)
POTASSIUM BLD-SCNC: 3.8 MMOL/L (ref 3.4–5.3)
PROT SERPL-MCNC: 7 G/DL (ref 6.8–8.8)
RBC # BLD AUTO: 4.88 10E6/UL (ref 3.8–5.2)
SODIUM SERPL-SCNC: 137 MMOL/L (ref 133–144)
SPECIMEN EXPIRATION DATE: NORMAL
SPECIMEN EXPIRATION DATE: NORMAL
WBC # BLD AUTO: 16.3 10E3/UL (ref 4–11)

## 2021-11-13 PROCEDURE — 258N000001 HC RX 258: Performed by: STUDENT IN AN ORGANIZED HEALTH CARE EDUCATION/TRAINING PROGRAM

## 2021-11-13 PROCEDURE — 272N000001 HC OR GENERAL SUPPLY STERILE: Performed by: SURGERY

## 2021-11-13 PROCEDURE — 250N000011 HC RX IP 250 OP 636: Performed by: PHYSICIAN ASSISTANT

## 2021-11-13 PROCEDURE — 250N000011 HC RX IP 250 OP 636: Performed by: SURGERY

## 2021-11-13 PROCEDURE — 86900 BLOOD TYPING SEROLOGIC ABO: CPT | Performed by: SURGERY

## 2021-11-13 PROCEDURE — 370N000017 HC ANESTHESIA TECHNICAL FEE, PER MIN: Performed by: SURGERY

## 2021-11-13 PROCEDURE — 258N000003 HC RX IP 258 OP 636: Performed by: STUDENT IN AN ORGANIZED HEALTH CARE EDUCATION/TRAINING PROGRAM

## 2021-11-13 PROCEDURE — G0378 HOSPITAL OBSERVATION PER HR: HCPCS

## 2021-11-13 PROCEDURE — 87070 CULTURE OTHR SPECIMN AEROBIC: CPT | Performed by: SURGERY

## 2021-11-13 PROCEDURE — 258N000003 HC RX IP 258 OP 636: Performed by: PHYSICIAN ASSISTANT

## 2021-11-13 PROCEDURE — 99207 PR CDG-MDM COMPONENT: MEETS MODERATE - UP CODED: CPT | Performed by: PHYSICIAN ASSISTANT

## 2021-11-13 PROCEDURE — 250N000011 HC RX IP 250 OP 636: Performed by: STUDENT IN AN ORGANIZED HEALTH CARE EDUCATION/TRAINING PROGRAM

## 2021-11-13 PROCEDURE — 250N000011 HC RX IP 250 OP 636: Performed by: EMERGENCY MEDICINE

## 2021-11-13 PROCEDURE — 86140 C-REACTIVE PROTEIN: CPT | Performed by: PHYSICIAN ASSISTANT

## 2021-11-13 PROCEDURE — 250N000009 HC RX 250: Performed by: STUDENT IN AN ORGANIZED HEALTH CARE EDUCATION/TRAINING PROGRAM

## 2021-11-13 PROCEDURE — 49321 LAPAROSCOPY BIOPSY: CPT | Mod: GC | Performed by: SURGERY

## 2021-11-13 PROCEDURE — 250N000013 HC RX MED GY IP 250 OP 250 PS 637: Performed by: STUDENT IN AN ORGANIZED HEALTH CARE EDUCATION/TRAINING PROGRAM

## 2021-11-13 PROCEDURE — 86900 BLOOD TYPING SEROLOGIC ABO: CPT | Performed by: STUDENT IN AN ORGANIZED HEALTH CARE EDUCATION/TRAINING PROGRAM

## 2021-11-13 PROCEDURE — 87075 CULTR BACTERIA EXCEPT BLOOD: CPT | Performed by: SURGERY

## 2021-11-13 PROCEDURE — 36415 COLL VENOUS BLD VENIPUNCTURE: CPT | Performed by: PHYSICIAN ASSISTANT

## 2021-11-13 PROCEDURE — 710N000010 HC RECOVERY PHASE 1, LEVEL 2, PER MIN: Performed by: SURGERY

## 2021-11-13 PROCEDURE — 74177 CT ABD & PELVIS W/CONTRAST: CPT | Mod: 26 | Performed by: RADIOLOGY

## 2021-11-13 PROCEDURE — 250N000025 HC SEVOFLURANE, PER MIN: Performed by: SURGERY

## 2021-11-13 PROCEDURE — 74177 CT ABD & PELVIS W/CONTRAST: CPT

## 2021-11-13 PROCEDURE — 96372 THER/PROPH/DIAG INJ SC/IM: CPT | Performed by: STUDENT IN AN ORGANIZED HEALTH CARE EDUCATION/TRAINING PROGRAM

## 2021-11-13 PROCEDURE — 36415 COLL VENOUS BLD VENIPUNCTURE: CPT | Performed by: STUDENT IN AN ORGANIZED HEALTH CARE EDUCATION/TRAINING PROGRAM

## 2021-11-13 PROCEDURE — 360N000077 HC SURGERY LEVEL 4, PER MIN: Performed by: SURGERY

## 2021-11-13 PROCEDURE — 83690 ASSAY OF LIPASE: CPT | Performed by: PHYSICIAN ASSISTANT

## 2021-11-13 PROCEDURE — 99226 PR SUBSEQUENT OBSERVATION CARE,LEVEL III: CPT | Performed by: PHYSICIAN ASSISTANT

## 2021-11-13 PROCEDURE — 120N000002 HC R&B MED SURG/OB UMMC

## 2021-11-13 PROCEDURE — 87040 BLOOD CULTURE FOR BACTERIA: CPT | Performed by: PHYSICIAN ASSISTANT

## 2021-11-13 PROCEDURE — 999N000141 HC STATISTIC PRE-PROCEDURE NURSING ASSESSMENT: Performed by: SURGERY

## 2021-11-13 PROCEDURE — 82962 GLUCOSE BLOOD TEST: CPT

## 2021-11-13 PROCEDURE — 258N000003 HC RX IP 258 OP 636: Performed by: EMERGENCY MEDICINE

## 2021-11-13 PROCEDURE — 0D9W40Z DRAINAGE OF PERITONEUM WITH DRAINAGE DEVICE, PERCUTANEOUS ENDOSCOPIC APPROACH: ICD-10-PCS | Performed by: SURGERY

## 2021-11-13 PROCEDURE — 87205 SMEAR GRAM STAIN: CPT | Performed by: SURGERY

## 2021-11-13 PROCEDURE — 250N000013 HC RX MED GY IP 250 OP 250 PS 637

## 2021-11-13 PROCEDURE — 83605 ASSAY OF LACTIC ACID: CPT | Performed by: SURGERY

## 2021-11-13 PROCEDURE — 82040 ASSAY OF SERUM ALBUMIN: CPT | Performed by: PHYSICIAN ASSISTANT

## 2021-11-13 PROCEDURE — 85027 COMPLETE CBC AUTOMATED: CPT | Performed by: PHYSICIAN ASSISTANT

## 2021-11-13 RX ORDER — ONDANSETRON 2 MG/ML
4 INJECTION INTRAMUSCULAR; INTRAVENOUS EVERY 30 MIN PRN
Status: DISCONTINUED | OUTPATIENT
Start: 2021-11-13 | End: 2021-11-13 | Stop reason: HOSPADM

## 2021-11-13 RX ORDER — PROPOFOL 10 MG/ML
INJECTION, EMULSION INTRAVENOUS PRN
Status: DISCONTINUED | OUTPATIENT
Start: 2021-11-13 | End: 2021-11-13

## 2021-11-13 RX ORDER — SODIUM CHLORIDE, SODIUM LACTATE, POTASSIUM CHLORIDE, CALCIUM CHLORIDE 600; 310; 30; 20 MG/100ML; MG/100ML; MG/100ML; MG/100ML
INJECTION, SOLUTION INTRAVENOUS CONTINUOUS
Status: DISCONTINUED | OUTPATIENT
Start: 2021-11-13 | End: 2021-11-13

## 2021-11-13 RX ORDER — FENTANYL CITRATE 50 UG/ML
INJECTION, SOLUTION INTRAMUSCULAR; INTRAVENOUS PRN
Status: DISCONTINUED | OUTPATIENT
Start: 2021-11-13 | End: 2021-11-13

## 2021-11-13 RX ORDER — DEXTROSE MONOHYDRATE, SODIUM CHLORIDE, AND POTASSIUM CHLORIDE 50; 1.49; 9 G/1000ML; G/1000ML; G/1000ML
INJECTION, SOLUTION INTRAVENOUS CONTINUOUS
Status: DISCONTINUED | OUTPATIENT
Start: 2021-11-13 | End: 2021-11-15

## 2021-11-13 RX ORDER — OXYCODONE HYDROCHLORIDE 5 MG/1
5 TABLET ORAL EVERY 4 HOURS PRN
Status: DISCONTINUED | OUTPATIENT
Start: 2021-11-13 | End: 2021-11-13 | Stop reason: HOSPADM

## 2021-11-13 RX ORDER — IOPAMIDOL 755 MG/ML
120 INJECTION, SOLUTION INTRAVASCULAR ONCE
Status: COMPLETED | OUTPATIENT
Start: 2021-11-13 | End: 2021-11-13

## 2021-11-13 RX ORDER — HYDROMORPHONE HCL IN WATER/PF 6 MG/30 ML
0.2 PATIENT CONTROLLED ANALGESIA SYRINGE INTRAVENOUS EVERY 5 MIN PRN
Status: DISCONTINUED | OUTPATIENT
Start: 2021-11-13 | End: 2021-11-13 | Stop reason: HOSPADM

## 2021-11-13 RX ORDER — ACETAMINOPHEN 325 MG/1
975 TABLET ORAL ONCE
Status: COMPLETED | OUTPATIENT
Start: 2021-11-13 | End: 2021-11-13

## 2021-11-13 RX ORDER — LIDOCAINE 40 MG/G
CREAM TOPICAL
Status: DISCONTINUED | OUTPATIENT
Start: 2021-11-13 | End: 2021-11-13 | Stop reason: HOSPADM

## 2021-11-13 RX ORDER — BUPIVACAINE HYDROCHLORIDE 2.5 MG/ML
INJECTION, SOLUTION INFILTRATION; PERINEURAL PRN
Status: DISCONTINUED | OUTPATIENT
Start: 2021-11-13 | End: 2021-11-13 | Stop reason: HOSPADM

## 2021-11-13 RX ORDER — HYDROMORPHONE HCL IN WATER/PF 6 MG/30 ML
0.2 PATIENT CONTROLLED ANALGESIA SYRINGE INTRAVENOUS ONCE
Status: DISCONTINUED | OUTPATIENT
Start: 2021-11-13 | End: 2021-11-13

## 2021-11-13 RX ORDER — DIMENHYDRINATE 50 MG/ML
25 INJECTION, SOLUTION INTRAMUSCULAR; INTRAVENOUS
Status: DISCONTINUED | OUTPATIENT
Start: 2021-11-13 | End: 2021-11-13 | Stop reason: HOSPADM

## 2021-11-13 RX ORDER — GABAPENTIN 300 MG/1
300 CAPSULE ORAL
Status: COMPLETED | OUTPATIENT
Start: 2021-11-13 | End: 2021-11-13

## 2021-11-13 RX ORDER — PIPERACILLIN SODIUM, TAZOBACTAM SODIUM 4; .5 G/20ML; G/20ML
4.5 INJECTION, POWDER, LYOPHILIZED, FOR SOLUTION INTRAVENOUS EVERY 6 HOURS
Status: DISCONTINUED | OUTPATIENT
Start: 2021-11-13 | End: 2021-11-13

## 2021-11-13 RX ORDER — SODIUM CHLORIDE, SODIUM LACTATE, POTASSIUM CHLORIDE, CALCIUM CHLORIDE 600; 310; 30; 20 MG/100ML; MG/100ML; MG/100ML; MG/100ML
INJECTION, SOLUTION INTRAVENOUS CONTINUOUS
Status: DISCONTINUED | OUTPATIENT
Start: 2021-11-13 | End: 2021-11-13 | Stop reason: HOSPADM

## 2021-11-13 RX ORDER — ALBUTEROL SULFATE 90 UG/1
AEROSOL, METERED RESPIRATORY (INHALATION) PRN
Status: DISCONTINUED | OUTPATIENT
Start: 2021-11-13 | End: 2021-11-13

## 2021-11-13 RX ORDER — DEXAMETHASONE SODIUM PHOSPHATE 4 MG/ML
INJECTION, SOLUTION INTRA-ARTICULAR; INTRALESIONAL; INTRAMUSCULAR; INTRAVENOUS; SOFT TISSUE PRN
Status: DISCONTINUED | OUTPATIENT
Start: 2021-11-13 | End: 2021-11-13

## 2021-11-13 RX ORDER — LIDOCAINE HYDROCHLORIDE 20 MG/ML
INJECTION, SOLUTION INFILTRATION; PERINEURAL PRN
Status: DISCONTINUED | OUTPATIENT
Start: 2021-11-13 | End: 2021-11-13

## 2021-11-13 RX ORDER — PIPERACILLIN SODIUM, TAZOBACTAM SODIUM 3; .375 G/15ML; G/15ML
INJECTION, POWDER, LYOPHILIZED, FOR SOLUTION INTRAVENOUS PRN
Status: DISCONTINUED | OUTPATIENT
Start: 2021-11-13 | End: 2021-11-13

## 2021-11-13 RX ORDER — PIPERACILLIN SODIUM, TAZOBACTAM SODIUM 3; .375 G/15ML; G/15ML
3.38 INJECTION, POWDER, LYOPHILIZED, FOR SOLUTION INTRAVENOUS ONCE
Status: COMPLETED | OUTPATIENT
Start: 2021-11-13 | End: 2021-11-13

## 2021-11-13 RX ORDER — PIPERACILLIN SODIUM, TAZOBACTAM SODIUM 3; .375 G/15ML; G/15ML
3.38 INJECTION, POWDER, LYOPHILIZED, FOR SOLUTION INTRAVENOUS EVERY 6 HOURS
Status: DISCONTINUED | OUTPATIENT
Start: 2021-11-13 | End: 2021-11-17 | Stop reason: HOSPADM

## 2021-11-13 RX ORDER — FENTANYL CITRATE 50 UG/ML
25 INJECTION, SOLUTION INTRAMUSCULAR; INTRAVENOUS EVERY 5 MIN PRN
Status: DISCONTINUED | OUTPATIENT
Start: 2021-11-13 | End: 2021-11-13 | Stop reason: HOSPADM

## 2021-11-13 RX ORDER — ESMOLOL HYDROCHLORIDE 10 MG/ML
INJECTION INTRAVENOUS PRN
Status: DISCONTINUED | OUTPATIENT
Start: 2021-11-13 | End: 2021-11-13

## 2021-11-13 RX ORDER — LIDOCAINE 40 MG/G
CREAM TOPICAL
Status: DISCONTINUED | OUTPATIENT
Start: 2021-11-13 | End: 2021-11-17 | Stop reason: HOSPADM

## 2021-11-13 RX ORDER — HYDRALAZINE HYDROCHLORIDE 20 MG/ML
2.5-5 INJECTION INTRAMUSCULAR; INTRAVENOUS EVERY 10 MIN PRN
Status: DISCONTINUED | OUTPATIENT
Start: 2021-11-13 | End: 2021-11-13 | Stop reason: HOSPADM

## 2021-11-13 RX ORDER — BISACODYL 10 MG
10 SUPPOSITORY, RECTAL RECTAL ONCE
Status: COMPLETED | OUTPATIENT
Start: 2021-11-13 | End: 2021-11-13

## 2021-11-13 RX ORDER — INDOCYANINE GREEN AND WATER 25 MG
2.5 KIT INJECTION ONCE
Status: DISCONTINUED | OUTPATIENT
Start: 2021-11-13 | End: 2021-11-13

## 2021-11-13 RX ORDER — ONDANSETRON 4 MG/1
4 TABLET, ORALLY DISINTEGRATING ORAL EVERY 30 MIN PRN
Status: DISCONTINUED | OUTPATIENT
Start: 2021-11-13 | End: 2021-11-13 | Stop reason: HOSPADM

## 2021-11-13 RX ORDER — METOPROLOL TARTRATE 1 MG/ML
1-2 INJECTION, SOLUTION INTRAVENOUS EVERY 5 MIN PRN
Status: DISCONTINUED | OUTPATIENT
Start: 2021-11-13 | End: 2021-11-13 | Stop reason: HOSPADM

## 2021-11-13 RX ADMIN — SODIUM CHLORIDE, POTASSIUM CHLORIDE, SODIUM LACTATE AND CALCIUM CHLORIDE: 600; 310; 30; 20 INJECTION, SOLUTION INTRAVENOUS at 14:55

## 2021-11-13 RX ADMIN — MAGNESIUM HYDROXIDE 30 ML: 400 SUSPENSION ORAL at 08:11

## 2021-11-13 RX ADMIN — IOPAMIDOL 120 ML: 755 INJECTION, SOLUTION INTRAVENOUS at 10:26

## 2021-11-13 RX ADMIN — BISACODYL 10 MG: 10 SUPPOSITORY RECTAL at 22:06

## 2021-11-13 RX ADMIN — PHENYLEPHRINE HYDROCHLORIDE 100 MCG: 10 INJECTION INTRAVENOUS at 14:08

## 2021-11-13 RX ADMIN — ESMOLOL HYDROCHLORIDE 10 MG: 10 INJECTION, SOLUTION INTRAVENOUS at 14:25

## 2021-11-13 RX ADMIN — ONDANSETRON 4 MG: 4 TABLET, ORALLY DISINTEGRATING ORAL at 00:59

## 2021-11-13 RX ADMIN — OXYCODONE HYDROCHLORIDE 10 MG: 5 TABLET ORAL at 18:09

## 2021-11-13 RX ADMIN — PROPOFOL 250 MG: 10 INJECTION, EMULSION INTRAVENOUS at 13:45

## 2021-11-13 RX ADMIN — PIPERACILLIN AND TAZOBACTAM 3.38 G: 3; .375 INJECTION, POWDER, LYOPHILIZED, FOR SOLUTION INTRAVENOUS at 13:28

## 2021-11-13 RX ADMIN — OXYCODONE HYDROCHLORIDE 10 MG: 5 TABLET ORAL at 02:48

## 2021-11-13 RX ADMIN — ACETAMINOPHEN 975 MG: 325 TABLET, FILM COATED ORAL at 13:07

## 2021-11-13 RX ADMIN — ACETAMINOPHEN 975 MG: 325 TABLET, FILM COATED ORAL at 21:48

## 2021-11-13 RX ADMIN — ONDANSETRON 4 MG: 2 INJECTION INTRAMUSCULAR; INTRAVENOUS at 06:59

## 2021-11-13 RX ADMIN — PIPERACILLIN AND TAZOBACTAM 3.38 G: 3; .375 INJECTION, POWDER, LYOPHILIZED, FOR SOLUTION INTRAVENOUS at 21:49

## 2021-11-13 RX ADMIN — OXYCODONE HYDROCHLORIDE 10 MG: 5 TABLET ORAL at 06:40

## 2021-11-13 RX ADMIN — ROCURONIUM BROMIDE 20 MG: 50 INJECTION, SOLUTION INTRAVENOUS at 14:27

## 2021-11-13 RX ADMIN — DEXAMETHASONE SODIUM PHOSPHATE 8 MG: 4 INJECTION, SOLUTION INTRA-ARTICULAR; INTRALESIONAL; INTRAMUSCULAR; INTRAVENOUS; SOFT TISSUE at 14:10

## 2021-11-13 RX ADMIN — POLYETHYLENE GLYCOL 3350 17 G: 17 POWDER, FOR SOLUTION ORAL at 08:11

## 2021-11-13 RX ADMIN — FENTANYL CITRATE 100 MCG: 50 INJECTION, SOLUTION INTRAMUSCULAR; INTRAVENOUS at 13:45

## 2021-11-13 RX ADMIN — SODIUM CHLORIDE, POTASSIUM CHLORIDE, SODIUM LACTATE AND CALCIUM CHLORIDE: 600; 310; 30; 20 INJECTION, SOLUTION INTRAVENOUS at 13:37

## 2021-11-13 RX ADMIN — ENOXAPARIN SODIUM 40 MG: 40 INJECTION SUBCUTANEOUS at 21:48

## 2021-11-13 RX ADMIN — ALBUTEROL SULFATE 6 PUFF: 108 INHALANT RESPIRATORY (INHALATION) at 13:52

## 2021-11-13 RX ADMIN — ALBUTEROL SULFATE 2 PUFF: 108 INHALANT RESPIRATORY (INHALATION) at 14:36

## 2021-11-13 RX ADMIN — ONDANSETRON 4 MG: 2 INJECTION INTRAMUSCULAR; INTRAVENOUS at 14:34

## 2021-11-13 RX ADMIN — IBUPROFEN 400 MG: 200 TABLET, FILM COATED ORAL at 08:11

## 2021-11-13 RX ADMIN — SUGAMMADEX 200 MG: 100 INJECTION, SOLUTION INTRAVENOUS at 14:40

## 2021-11-13 RX ADMIN — FENTANYL CITRATE 25 MCG: 50 INJECTION, SOLUTION INTRAMUSCULAR; INTRAVENOUS at 15:45

## 2021-11-13 RX ADMIN — PIPERACILLIN AND TAZOBACTAM 3.38 G: 3; .375 INJECTION, POWDER, FOR SOLUTION INTRAVENOUS at 13:37

## 2021-11-13 RX ADMIN — SODIUM CHLORIDE: 9 INJECTION, SOLUTION INTRAVENOUS at 02:48

## 2021-11-13 RX ADMIN — FENTANYL CITRATE 25 MCG: 50 INJECTION, SOLUTION INTRAMUSCULAR; INTRAVENOUS at 15:26

## 2021-11-13 RX ADMIN — HYDROXYZINE HYDROCHLORIDE 25 MG: 25 TABLET, FILM COATED ORAL at 04:49

## 2021-11-13 RX ADMIN — POTASSIUM CHLORIDE, DEXTROSE MONOHYDRATE AND SODIUM CHLORIDE: 150; 5; 900 INJECTION, SOLUTION INTRAVENOUS at 22:54

## 2021-11-13 RX ADMIN — ROCURONIUM BROMIDE 20 MG: 50 INJECTION, SOLUTION INTRAVENOUS at 14:10

## 2021-11-13 RX ADMIN — GABAPENTIN 300 MG: 300 CAPSULE ORAL at 13:07

## 2021-11-13 RX ADMIN — ACETAMINOPHEN 975 MG: 325 TABLET, FILM COATED ORAL at 06:40

## 2021-11-13 RX ADMIN — POLYETHYLENE GLYCOL 3350 17 G: 17 POWDER, FOR SOLUTION ORAL at 21:49

## 2021-11-13 RX ADMIN — SODIUM CHLORIDE, POTASSIUM CHLORIDE, SODIUM LACTATE AND CALCIUM CHLORIDE: 600; 310; 30; 20 INJECTION, SOLUTION INTRAVENOUS at 13:07

## 2021-11-13 RX ADMIN — LIDOCAINE HYDROCHLORIDE 100 MG: 20 INJECTION, SOLUTION INFILTRATION; PERINEURAL at 13:45

## 2021-11-13 RX ADMIN — PIPERACILLIN SODIUM AND TAZOBACTAM SODIUM 4.5 G: 4; .5 INJECTION, POWDER, LYOPHILIZED, FOR SOLUTION INTRAVENOUS at 08:14

## 2021-11-13 RX ADMIN — Medication 100 MG: at 13:45

## 2021-11-13 RX ADMIN — ESMOLOL HYDROCHLORIDE 10 MG: 10 INJECTION, SOLUTION INTRAVENOUS at 14:19

## 2021-11-13 RX ADMIN — SODIUM CHLORIDE 500 ML: 9 INJECTION, SOLUTION INTRAVENOUS at 07:05

## 2021-11-13 ASSESSMENT — LIFESTYLE VARIABLES: TOBACCO_USE: 1

## 2021-11-13 ASSESSMENT — COPD QUESTIONNAIRES: COPD: 0

## 2021-11-13 NOTE — ANESTHESIA PROCEDURE NOTES
Airway       Patient location during procedure: OR       Procedure Start/Stop Times: 11/13/2021 1:56 PM  Staff -        Anesthesiologist:  Roma Quintanilla MD       Resident/Fellow: Jeovanny Dumont MD       Performed By: resident  Consent for Airway        Urgency: elective  Indications and Patient Condition       Indications for airway management: hernan-procedural       Induction type:RSI       Mask difficulty assessment: 0 - not attempted    Final Airway Details       Final airway type: endotracheal airway       Successful airway: ETT - single  Endotracheal Airway Details        ETT size (mm): 7.0       Cuffed: yes       Successful intubation technique: direct laryngoscopy       DL Blade Type: MAC 3       Grade View of Cords: 1       Adjucts: stylet       Position: Right       Measured from: gums/teeth       Secured at (cm): 23       Bite block used: Soft    Post intubation assessment        Placement verified by: capnometry, equal breath sounds and chest rise        Number of attempts at approach: 1       Number of other approaches attempted: 0       Secured with: pink tape       Ease of procedure: easy       Dentition: Intact and Unchanged    Additional Comments       Bronchospasm after intubation, transient desaturation to mid 80s, broken with deepening of anesthetic, 6 puffs albuterol, and manual ventilation.

## 2021-11-13 NOTE — OR NURSING
Patient stated she left her phone  plugged into wall. Writer passed this info to patient's RN on 5A, Douglas Garcia RN acknowledged and will put with belongings to transfer to 7B. Operative/PACU report also given to Douglas HENDERSON RN, Douglas to give full patient report/handoff to  RN when bed available.

## 2021-11-13 NOTE — PROGRESS NOTES
Surgery Progress Note  11/13/2021       Subjective:  Continues to have pain and nausea. Fever this am, rapid called for altered mental status in this setting. Not tolerating diet. No BM despite going up on bowel regimen, but passing gas. Voiding independently. Ambulating.     Objective:  Temp:  [97.6  F (36.4  C)-101.3  F (38.5  C)] 101.3  F (38.5  C)  Pulse:  [] 101  Resp:  [16-18] 18  BP: (123-137)/(80-83) 123/80  SpO2:  [95 %-98 %] 97 %    I/O last 3 completed shifts:  In: 2218.75 [I.V.:2218.75]  Out: -       Gen: Uncomfortable appearing, tearful.  Resp: NLB on RA  Abd: soft, non distended. Tender to palpation, no guarding or signs of peritonitis.   Incision: c/d/i  Ext: WWP, no edema     Labs:  Recent Labs   Lab 11/12/21  0725 11/11/21  0026   WBC 10.9 12.4*   HGB 10.6* 12.0    385       Recent Labs   Lab 11/11/21  0026      POTASSIUM 4.0   CHLORIDE 105   CO2 28   BUN 7   CR 0.74   *   YOVANI 8.8       Imaging:  CT on admission reviewed.      Assessment/Plan:   20 year old female s/p subtotal johnnie 11/9 with Dr. Beckett at [facility]. Drain removal on 11/10/21 prompted abdominal pain and nausea which brought her to the ED. Imaging at that time showed small fluid collection, possibly hematoma vs expected gallbladder contents s/p subtotal. Would not expect abscess formation this early postoperatively, but will image today given acute worsening of abdominal pain this am.   - CT A/P today, IV and PO contrast - can evaluate for any possible intraop bowel injury  - Labs unremarkable this am, leukocytosis but may have component of hemoconcentration  - NPO in meantime  - Pain regimen   -- DULCE tylenol 975 qid   -- PRN oxy 5-10 q4, robaxin, atarax, ibuprofen - please give non-narcotic prns before giving narcotic prns  - MOM daily until BM, continue scheduled taylor  - Activity as tolerated     Seen, examined, and discussed with chief resident, who will discuss with staff.  - - - - - - - - - - - -  - - - - - -  Gurvinder Heller MD  Surgery Resident

## 2021-11-13 NOTE — PROGRESS NOTES
Pulm: sating 95%> on RA MET  GI: Needs to tolerate regular diet. Nausea needs to resolve. Needs to have bowel function (passing gas or BM)   If not tolerating diet, go NPO. NOT MET Tolerated breakfast but had increased abdominal pain after, no BM since prior to surgery & hypoactive BS/ no gas  : To Void Spontaneously MET  MSK: Ambulate well NOT MET: Up to bathroom and showered independently

## 2021-11-13 NOTE — BRIEF OP NOTE
[facility]    Brief Operative Note    Pre-operative diagnosis: Post-operative complication [T81.9XXA]  Post-operative diagnosis: Bile leak    Procedure: Procedure(s):  INSERTION, DRAIN, ABDOMINAL  Laparoscopic abdominal washout  Surgeon: Surgeon(s) and Role:     * Sukumar Oliva MD - Primary     * Jonas Newman MD - Resident - Assisting  Anesthesia: General   Estimated Blood Loss: Minimal    Drains: Oumar-Johnson  Specimens:   ID Type Source Tests Collected by Time Destination   A : Abdominal Fluid Body fluid, unsp Other ANAEROBIC BACTERIAL CULTURE ROUTINE, GRAM STAIN, AEROBIC BACTERIAL CULTURE ROUTINE Sukumar Oliva MD 11/13/2021  1:26 PM      Findings: 500cc of bile. Hematoma in GB fossa  Complications: None.  Implants: * No implants in log *    Return to floor  ADAT  MIVF  Zosyn x4 days postop  Await bile cultures  Ambulate  [facility]    Jonas Newman MD  PGY-5 Surgery

## 2021-11-13 NOTE — PROGRESS NOTES
Brief Surgery Progress Note  11/13/2021    CT today after increased pain and fever this morning showed increase in size of post-operative fluid collection. Labs from earlier within normal limits. Likely too early for postoperative abscess formation, but increase in size is concerning for possible bile leak despite lack of lab findings.   - OR for lap abdominal washout with drain placement  - ICG 2.5mg ordered pre-operatively  - case request submitted, OR scheduling aware  - consented    Please call or page with any questions     Gurvinder Heller MD  Surgery Resident

## 2021-11-13 NOTE — ANESTHESIA PREPROCEDURE EVALUATION
Anesthesia Pre-Procedure Evaluation    Patient: Malika Stokes   MRN: [id number] : 2000        Preoperative Diagnosis: Post-operative complication [T81.9XXA]    Procedure : Procedure(s):  INSERTION, DRAIN, ABDOMINAL  Laparoscopic abdominal washout          History reviewed. No pertinent past medical history.   History reviewed. No pertinent surgical history.   No Known Allergies   Social History     Tobacco Use     Smoking status: Never Smoker     Smokeless tobacco: Never Used   Substance Use Topics     Alcohol use: Not Currently      Wt Readings from Last 1 Encounters:   11/10/21 89.4 kg (197 lb)        Anesthesia Evaluation   Pt has had prior anesthetic. Type: General.    No history of anesthetic complications       ROS/MED HX  ENT/Pulmonary:     (+) tobacco use, Past use,  (-) asthma and COPD   Neurologic:  - neg neurologic ROS     Cardiovascular:     (+) -----Previous cardiac testing   Echo: Date: Results:    Stress Test: Date: Results:    ECG Reviewed: Date: 11/10 Results:  - sinus rhythm with sinus arrhythmia, 76bpm  Cath: Date: Results:      METS/Exercise Tolerance: >4 METS    Hematologic:     (+) anemia (hgb 11.5, ),     Musculoskeletal:  - neg musculoskeletal ROS     GI/Hepatic: Comment: - Chronic cholecystitis s/p laparoscopic subtotal cholecystectomy (), and drain removal (11/10)  - Elevated AST/ALT       Renal/Genitourinary:  - neg Renal ROS     Endo:     (+) Obesity (BMI 31),     Psychiatric/Substance Use:       Infectious Disease:       Malignancy:  - neg malignancy ROS     Other:     (-) Any chance pregnant          OUTSIDE LABS:  CBC:   Lab Results   Component Value Date    WBC 16.3 (H) 2021    WBC 10.9 2021    HGB 11.5 (L) 2021    HGB 10.6 (L) 2021    HCT 35.6 2021    HCT 32.8 (L) 2021     2021     2021     BMP:   Lab Results   Component Value Date     2021     2021    POTASSIUM 3.8  11/13/2021    POTASSIUM 4.0 11/11/2021    CHLORIDE 107 11/13/2021    CHLORIDE 105 11/11/2021    CO2 26 11/13/2021    CO2 28 11/11/2021    BUN 6 (L) 11/13/2021    BUN 7 11/11/2021    CR 0.54 11/13/2021    CR 0.74 11/11/2021     (H) 11/13/2021     (H) 11/11/2021     COAGS: No results found for: PTT, INR, FIBR  POC:   Lab Results   Component Value Date    HCGS Negative 11/11/2021     HEPATIC:   Lab Results   Component Value Date    ALBUMIN 2.3 (L) 11/13/2021    PROTTOTAL 7.0 11/13/2021    ALT 98 (H) 11/13/2021    AST 81 (H) 11/13/2021    ALKPHOS 227 (H) 11/13/2021    BILITOTAL 0.8 11/13/2021     OTHER:   Lab Results   Component Value Date    LACT 1.0 11/13/2021    YOVANI 8.2 (L) 11/13/2021    LIPASE 216 11/13/2021    .0 (H) 11/13/2021       Anesthesia Plan    ASA Status:  2   NPO Status:  NPO Appropriate    Anesthesia Type: General.     - Airway: ETT   Induction: Intravenous.   Maintenance: Balanced.   Techniques and Equipment:     - Lines/Monitors: BIS     Consents    Anesthesia Plan(s) and associated risks, benefits, and realistic alternatives discussed. Questions answered and patient/representative(s) expressed understanding.     - Discussed with:  Patient      - Patient is DNR/DNI Status: No         Postoperative Care    Pain management: Oral pain medications.   PONV prophylaxis: Ondansetron (or other 5HT-3), Dexamethasone or Solumedrol     Comments:                Mariaa Mascorro MD

## 2021-11-13 NOTE — PLAN OF CARE
"Time 2736-3417     Reason for admission:   Postoperative abdominal pain [R10.9, G89.18]  Lab test negative for COVID-19 virus [Z20.822]     Vitals: /77 (BP Location: Right arm)   Pulse 95   Temp 98.6  F (37  C) (Oral)   Resp 26   Ht 1.702 m (5' 7\")   Wt 89.4 kg (197 lb)   SpO2 95%   BMI 30.85 kg/m       Activity: Up SBA   Pain: Reports pain in upper and right abdomen, tender, increased from yesterday PRN oxycodone, tylenol and Ibuprofen given   Neuro: A&Ox4  Cardiac: Tachycardic   Respiratory: WDL on RA   GI/: Milk of mag and miralax given, No BM   Diet: NPO   Lines: L PIV running NS @125  Skin/Wounds: WDL abdominal incisions and drain site      This shift: Abdominal CT, completed. Off unit for surgical procedure. Transferred to 7B. Report received from PACU and given to 7B RN.       "

## 2021-11-13 NOTE — PROGRESS NOTES
Pulm: sating 95%> on RA MET  GI: Needs to tolerate regular diet. Nausea needs to resolve. Needs to have bowel function (passing gas or BM)   If not tolerating diet, go NPO. NOT MET Tolerated breakfast but had increased abdominal pain after, no BM since prior to surgery & Normoactive BS, passing gas  : To Void Spontaneously MET  MSK: Ambulate well NOT MET: Up to bathroom and showered independently

## 2021-11-13 NOTE — PLAN OF CARE
"/81 (BP Location: Right arm)   Pulse 97   Temp 98.8  F (37.1  C) (Oral)   Resp 23   Ht 1.702 m (5' 7\")   Wt 89.4 kg (197 lb)   SpO2 95%   BMI 30.85 kg/m    Pt arrived from PACU at 1650, VSS on 1L. Denies pain, denies nausea. BS hypoactive, not passed gas since yesterday per pt - suppository ordered, commode ordered. Lungs clear, heart rate regular. OMAR to bulb suction, bile/serosanguinous output.   Skin check: Admitted/transferred from: PACU  2 RN full except beneath primapores covering lap sites   skin assessment completed by Alicia Anglin, BRIDGET and Katia Inman RN.  Skin assessment finding: issues found lap sites x2 covered with primapore from surgery today, umbilical lap site from johnnie dermabonded, OMAR site covered with primapore. No other skin issues noted.     Interventions/actions: other none at this time, monitor incisions    Obs goals:   Pulm: sating 95%> on RA not met, on 1L satting>95%  GI: Needs to tolerate regular diet. Nausea needs to resolve. Needs to have bowel function (passing gas or BM) denies nausea, no BM, not passing gas yet, supp ordered  If not tolerating diet, go NPO. not met, on clears, sips of water/apple juice  : To Void Spontaneously voided 300mL using bedpan  MSK: Ambulate well not OOB since transfer from PACU    "

## 2021-11-13 NOTE — OP NOTE
[facility] Operative Note    Pre-operative diagnosis: Post-operative complication [T81.9XXA]   Post-operative diagnosis Bile peritonitis    Procedure: Procedure(s):  INSERTION, DRAIN, ABDOMINAL  Laparoscopic abdominal washout   Surgeon(s): Surgeon(s) and Role:     * Sukumar Oliva MD - Primary     * Jonas Newman MD - Resident - Assisting   Estimated blood loss: 2ml   Specimens: ID Type Source Tests Collected by Time Destination   A : Abdominal Fluid Body fluid, unsp Other ANAEROBIC BACTERIAL CULTURE ROUTINE, GRAM STAIN, AEROBIC BACTERIAL CULTURE ROUTINE Sukumar Oliva MD 11/13/2021  1:26 PM       Findings: ~600ml of bile in the abdomen with peritonitis. Abdomen irrigated,bile sent for culture.  Drain placed in gallbladder fossa and out RLQ       Description:    Malika Stokes was brought to the operating room and placed supine on the operating table with the bed flexed.  ABX were given.  They were sedated and intubated without issue, an OG tube was placed.  The abdomen was prepped and draped in sterile fashion and a time out was performed.    We bluntly entered the abdomen through her epigastric port site incision with a 5mm port.  The abdomen was insufflated and the laparoscope inserted.   2 additional ports were placed along her subcostal incision sites.    Bile was immediately encountered.  A sample was obtained and sent for cultures.  We then suctioned out ~600-700ml of bile from all 4 quadrants of her abdomen.  The liver was adherent to the anterior abdominal wall and omentum was adherent in the gallbladder fossa. This was gently lyzed bluntly.  Old surgicel was seen in the gallbladder fossa.  Given the friable tissue we did not fully examine the remnant gallbladder or suture line.  A round drain was brought into the abdomen and placed in the gallbladder fossa and the right paracolic gutter and out the RLQ incision.    The ports were then removed and the abdomen allowed to collapse.   Skin was closed with 4.0 monocryl and gauze applied.  Malika Stokes was then woken from anesthesia, extubated and brought to recovery.    I performed the procedure.

## 2021-11-13 NOTE — PLAN OF CARE
Time: 1797-4300    Reason for admission: Postoperative abdominal pain [R10.9, G89.18]  Lab test negative for COVID-19 virus [Z20.822]  Vitals: BP: 137/83, T: 98.9, P: 111, R: 18  Activity:  SBA  Pain: Pain reports 10/10 pain after 0200.  PRN oxy and atarax given with no relief, tried repositioning and cold packs as well.  Paged gen surg for one time dose of prn meds.  Neuro:  AOx4  Cardiac:   WDL  Respiratory: WDL  GI/:  Voids without difficulty, no BM during stay.  Passing flatus  Diet:  Regular with poor intake.  Lines:  PIV infusing at 125 mL/hr NS  Skin/Wounds: Intact     New changes this shift: Pt began to report consistent 10/10 pain at 0200 this AM.  No sleep this shift.  Pt triggered sepsis protocol this AM.  Called rapid when the patient was unarousable.  Pt initially upon arousal was not able to respond to questions and was unable to focus and look at writer.  Pt was eventually able to answer orientation questions.    Plan:  Encourage progress to meet observation goals.    Pulm: satting 95%> on RA MET  GI: Needs to tolerate regular diet. Nausea needs to resolve. Needs to have bowel function (passing gas or BM)   If not tolerating diet, go NPO. NOT MET Tolerated breakfast but had increased abdominal pain after, no BM since prior to surgery & Normoactive BS, passing gas  : To Void Spontaneously MET  MSK: Ambulate well NOT MET: Up to bathroom and showered independently

## 2021-11-13 NOTE — ANESTHESIA CARE TRANSFER NOTE
Patient: Malika Stokes    Procedure: Procedure(s):  INSERTION, DRAIN, ABDOMINAL  Laparoscopic abdominal washout       Diagnosis: Post-operative complication [T81.9XXA]  Diagnosis Additional Information: No value filed.    Anesthesia Type:   General     Note:    Oropharynx: oropharynx clear of all foreign objects  Level of Consciousness: awake  Oxygen Supplementation: face mask  Level of Supplemental Oxygen (L/min / FiO2): 6  Independent Airway: airway patency satisfactory and stable  Dentition: dentition unchanged  Vital Signs Stable: post-procedure vital signs reviewed and stable  Report to RN Given: handoff report given  Patient transferred to: PACU  Comments: VSS. Breathing spontaneously at a regular rate with adequate tidal volumes and maintaining O2 sats. Denies nausea or pain. No apparent complications from anesthesia.     Jeovanny Dumont MD  Anesthesia CA-1    Handoff Report: Identifed the Patient, Identified the Reponsible Provider, Reviewed the pertinent medical history, Discussed the surgical course, Reviewed Intra-OP anesthesia mangement and issues during anesthesia, Set expectations for post-procedure period and Allowed opportunity for questions and acknowledgement of understanding      Vitals:  Vitals Value Taken Time   /78 11/13/21 1500   Temp 37.5  C (99.5  F) 11/13/21 1453   Pulse 99 11/13/21 1507   Resp 18 11/13/21 1500   SpO2 100 % 11/13/21 1507   Vitals shown include unvalidated device data.    Electronically Signed By: Jeovanny Dumont MD  November 13, 2021  3:09 PM

## 2021-11-13 NOTE — ANESTHESIA POSTPROCEDURE EVALUATION
Patient: Malika Stokes    Procedure: Procedure(s):  INSERTION, DRAIN, ABDOMINAL  Laparoscopic abdominal washout       Diagnosis:Post-operative complication [T81.9XXA]  Diagnosis Additional Information: No value filed.    Anesthesia Type:  General    Note:  Disposition: Inpatient   Postop Pain Control: Uneventful            Sign Out: Well controlled pain   PONV: No   Neuro/Psych: Uneventful            Sign Out: Acceptable/Baseline neuro status   Airway/Respiratory:             Events: Bronchospasm            Sign Out: Acceptable/Baseline resp. status   CV/Hemodynamics: Uneventful            Sign Out: Acceptable CV status   Other NRE: NONE   DID A NON-ROUTINE EVENT OCCUR? YES    Event details/Postop Comments:  Performed rapid sequence induction, easy intubation, grade 1 view with DL. Patient experienced significant bronchospasm after intubation. Deepened anesthetic, albuterol given, ventilation improved. Gave more albuterol near end of case. Uneventful extubation. Discussed event with patient prior to discharge from PACU, recommended albuterol nebs prior to any future anesthetics, patient aware and all questions answered.            Last vitals:  Vitals Value Taken Time   /85 11/13/21 1530   Temp 37.5  C (99.5  F) 11/13/21 1453   Pulse 106 11/13/21 1538   Resp 22 11/13/21 1515   SpO2 95 % 11/13/21 1538   Vitals shown include unvalidated device data.    Electronically Signed By: Roma Quintanilla MD  November 13, 2021  3:39 PM

## 2021-11-13 NOTE — PROGRESS NOTES
Observation Goals    Pulm: sating 95%> on RA MET  GI: Needs to tolerate regular diet. Nausea needs to resolve. Needs to have bowel function (passing gas or BM)   If not tolerating diet, go NPO. Not MET NPO this AM, no BM since prior to surgery (11/9) & hypoactive BS  : To Void Spontaneously MET  MSK: Ambulate well NOT MET: Up to Commode SBA

## 2021-11-13 NOTE — PROGRESS NOTES
11/13/21 0700   Call Information   Date of Call 11/13/21   Time of Call 0645   Name of person requesting the team Guanaco Vazquez   Title of person requesting team RN   RRT Arrival time 0647   Time RRT ended 0700   Reason for call   Type of RRT Adult   Primary reason for call Neurological;Pain;Staff concerned;Sepsis suspected   Neurological Unresponsive   Pain Recurring   Pain Location RLQ   Sepsis Suspected Temperature <95 or >101;Heart Rate > 100;CNS: Altered mental status;Pain presence and interventions   Was patient transferred from the ED, ICU, or PACU within last 24 hours prior to RRT call? No   SBAR   Situation Bedside nurse came into room for morning meds and found patient very difficult to arouse and disoriented. Patient also has triggered sepsis protocol with new fever Tm 101.2,    Background Patient is a 21yo female with recent history of laproscopic cholecystectomy on 11/9 at [facility]. Drain removal on 11/10 promted patient to come to [facility] ED for N/V and pain. 11/11 CT showed possible hematoma vs abscess at surgical site.   Notable History/Conditions Recent surgery   Assessment Upon response team entering room patient was found to be alert and oriented x4. Complaining of 10/10 RLQ abdominal pain and nausea. Abdomen very tender to palpation, no obvious signs of infection with lap sites clean, without redness or discharge.    Interventions Fluid bolus;Labs;Meds;Other (describe)  (Stat abd CT, starting broad spectrum antibiotics)   Patient Outcome   Patient Outcome Stabilized on unit   RRT Team   Attending/Primary/Covering Physician Surgery   Date Attending Physician notified 11/13/21   Time Attending Physician notified 0645   Physician(s) Marcella Shi PA-C   Lead RN Edmund Vazquez   RT None   Post RRT Intervention Assessment   Post RRT Assessment Stable/Improved   Date Follow Up Done 11/13/21   Time Follow Up Done 0950   Comments BC drawn. pt reports that  her pain has eased somewhat post oral pain meds

## 2021-11-13 NOTE — CODE/RAPID RESPONSE
Rapid Response Team Note    Assessment   In assessment a rapid response was called on Malika Stokes due to acute encephalopathy. This presentation is likely due to medication (new oxycodone) vs developing sepsis given new fever and worsened by Recent subtotal cholecystectomy surgery for chronic cholecystitis, now 4 days post operative.     Plan   -  Stat CBC, CMP, CRP, Lipase, Blood Cx x 2  -  Triggered sepsis BPA, lactic acid being drawn now  -  Stat CT Abd/Pelvis  -  NS 500mL bolus  -  Start Zosyn 4.5gm q6h after blood cultures obtained  -  The General Surgery primary team was paged and I spoke with the resident Dr. Heller. Dr. Heller requests discontinuing Zosyn and CT scan as they do not think she has an infection. Advised resident patient appears clinically septic based on new fever (Tmax 101.3), acutely worsening abdominal pain, significant tenderness with guarding on exam, altered mental status this AM. Resident reports pt had a CT scan 2 days ago so does not need this repeated today. (Report from CT 2 days ago 1.  Recent postsurgical change of cholecystectomy. Complex density in the gallbladder fossa could be postsurgical hematoma. There is a small amount of complex air in the gallbladder fossa likely postsurgical. Close interval follow-up to assess for resolution and exclude evolving abscess recommended. Small amount of free fluid adjacent to the liver and moderate amount within the pelvis. If there is clinical concern for bile leak then HIDA scan is recommended. 2.  Small linear areas of low-attenuation within the right liver adjacent to the gallbladder fossa likely small areas of laceration related to recent surgery. 3.  Right colonic wall thickening may be secondary inflammation of the colon. 4.  3.8 cm left ovarian cyst.) This writer requested primary team come to the bedside to assess patient and defer cancelling antibiotics and CT scan to primary service as these are both clinically indicated  at this time on my exam.  -  Disposition: The patient will remain on the current unit. We will continue to monitor this patient closely.  -  Reassessment and plan follow-up will be performed by the primary team    LAURA JAMESON PA  [facility] RRT AMCOM Job Code Contact #[phone]    Hospital Course   Brief Summary of events leading to rapid response:   RRT called for somnolence/altered mental status. POD #5, now with new fever and worsening abdominal pain overnight. Primary team was contacted, did not assess patient at bedside per bedside RN. Ordered oxycodone and Tylenol. Pt noted to be more somnolent this morning, difficult to arouse to take PO meds. Moaning, unable to answer orientation questions. Mental status appears to be improving at this time but still not back to baseline. Triggered sepsis BPA w/ tachycardia and fever.    Pt reports pain worst RUQ, radiates across upper abdomen. Pain has been worse tonight. Oxycodone has been helping some. Continues to be nauseated.    Admission Diagnosis:   Postoperative abdominal pain [R10.9, G89.18]  Lab test negative for COVID-19 virus [Z20.822]     Physical Exam   Temp: (!) 101.2  F (38.4  C) Temp  Min: 97.6  F (36.4  C)  Max: 101.3  F (38.5  C)  Resp: 18 Resp  Min: 16  Max: 18  SpO2: 96 % SpO2  Min: 95 %  Max: 98 %  Pulse: 106 Pulse  Min: 95  Max: 111    No data recorded  BP: (!) 131/90 Systolic (24hrs), Av , Min:123 , Max:137   Diastolic (24hrs), Av, Min:80, Max:90     I/Os: I/O last 3 completed shifts:  In: 2218.75 [I.V.:2218.75]  Out: -      Exam:   General: acutely ill appearing  Mental Status: able to answer questions appropriately but slow to respond.  CV: Tachycardic  Resp: Nonlabored, CTAB  GI: Exquisite TTP RUQ, moderate TTP across entire upper abd, mild generalized tenderness. Nondistended. No rigidity but some guarding is present.    Significant Results and Procedures   Lactic Acid: No results for input(s): LACT, LACTS in the last 74552  hours.  CBC:   Recent Labs   Lab Test 11/12/21  0725 11/11/21  0026   WBC 10.9 12.4*   HGB 10.6* 12.0   HCT 32.8* 37.3    385        Sepsis Evaluation   The patient is not known to have an infection.  Malika Stokes meets SIRS criteria. Further work-up is pending to confirm diagnosis of sepsis vs severe sepsis.   - 500mL fluids ordered to be given now  - Blood cultures ordered now  - Start Zosyn for suspected intra-abdominal infection once cultures obtained.  - Await results of lab tests as ordered above.    **Addendum:  WBC 16.3 (10.9). Lactate 1.0. AlkP 227 (95), ALT 98 (64), AST 81 (34). Labs o/w w/o significant change.    Malika Stokes meets SIRS criteria but does NOT have a lactate >2 or other evidence of acute organ damage.  These vital sign, lab and physical exam findings constitute a diagnosis of SEPSIS.    Sepsis Time-Zero (time Sepsis diagnosis confirmed): 0657  11/13/21

## 2021-11-14 LAB
ALBUMIN SERPL-MCNC: 1.8 G/DL (ref 3.4–5)
ALP SERPL-CCNC: 178 U/L (ref 40–150)
ALT SERPL W P-5'-P-CCNC: 60 U/L (ref 0–50)
ANION GAP SERPL CALCULATED.3IONS-SCNC: 6 MMOL/L (ref 3–14)
AST SERPL W P-5'-P-CCNC: 26 U/L (ref 0–45)
BILIRUB SERPL-MCNC: 0.6 MG/DL (ref 0.2–1.3)
BUN SERPL-MCNC: 5 MG/DL (ref 7–30)
CALCIUM SERPL-MCNC: 8.1 MG/DL (ref 8.5–10.1)
CHLORIDE BLD-SCNC: 109 MMOL/L (ref 94–109)
CO2 SERPL-SCNC: 27 MMOL/L (ref 20–32)
CREAT SERPL-MCNC: 0.55 MG/DL (ref 0.52–1.04)
ERYTHROCYTE [DISTWIDTH] IN BLOOD BY AUTOMATED COUNT: 13.6 % (ref 10–15)
GFR SERPL CREATININE-BSD FRML MDRD: >90 ML/MIN/1.73M2
GLUCOSE BLD-MCNC: 129 MG/DL (ref 70–99)
HCT VFR BLD AUTO: 29.3 % (ref 35–47)
HGB BLD-MCNC: 9.7 G/DL (ref 11.7–15.7)
LACTATE SERPL-SCNC: 1 MMOL/L (ref 0.7–2)
MCH RBC QN AUTO: 23.7 PG (ref 26.5–33)
MCHC RBC AUTO-ENTMCNC: 33.1 G/DL (ref 31.5–36.5)
MCV RBC AUTO: 72 FL (ref 78–100)
PLATELET # BLD AUTO: 421 10E3/UL (ref 150–450)
POTASSIUM BLD-SCNC: 3.7 MMOL/L (ref 3.4–5.3)
PROT SERPL-MCNC: 6.2 G/DL (ref 6.8–8.8)
RBC # BLD AUTO: 4.09 10E6/UL (ref 3.8–5.2)
SODIUM SERPL-SCNC: 142 MMOL/L (ref 133–144)
WBC # BLD AUTO: 19.4 10E3/UL (ref 4–11)

## 2021-11-14 PROCEDURE — 250N000013 HC RX MED GY IP 250 OP 250 PS 637

## 2021-11-14 PROCEDURE — 120N000002 HC R&B MED SURG/OB UMMC

## 2021-11-14 PROCEDURE — 36415 COLL VENOUS BLD VENIPUNCTURE: CPT | Performed by: SURGERY

## 2021-11-14 PROCEDURE — 83605 ASSAY OF LACTIC ACID: CPT | Performed by: SURGERY

## 2021-11-14 PROCEDURE — 80053 COMPREHEN METABOLIC PANEL: CPT | Performed by: STUDENT IN AN ORGANIZED HEALTH CARE EDUCATION/TRAINING PROGRAM

## 2021-11-14 PROCEDURE — 250N000011 HC RX IP 250 OP 636

## 2021-11-14 PROCEDURE — 999N000128 HC STATISTIC PERIPHERAL IV START W/O US GUIDANCE

## 2021-11-14 PROCEDURE — 85027 COMPLETE CBC AUTOMATED: CPT | Performed by: STUDENT IN AN ORGANIZED HEALTH CARE EDUCATION/TRAINING PROGRAM

## 2021-11-14 PROCEDURE — 250N000011 HC RX IP 250 OP 636: Performed by: STUDENT IN AN ORGANIZED HEALTH CARE EDUCATION/TRAINING PROGRAM

## 2021-11-14 PROCEDURE — 36415 COLL VENOUS BLD VENIPUNCTURE: CPT | Performed by: STUDENT IN AN ORGANIZED HEALTH CARE EDUCATION/TRAINING PROGRAM

## 2021-11-14 PROCEDURE — 250N000011 HC RX IP 250 OP 636: Performed by: SURGERY

## 2021-11-14 PROCEDURE — G0378 HOSPITAL OBSERVATION PER HR: HCPCS

## 2021-11-14 PROCEDURE — 258N000001 HC RX 258: Performed by: STUDENT IN AN ORGANIZED HEALTH CARE EDUCATION/TRAINING PROGRAM

## 2021-11-14 RX ORDER — HYDROMORPHONE HYDROCHLORIDE 1 MG/ML
0.3 INJECTION, SOLUTION INTRAMUSCULAR; INTRAVENOUS; SUBCUTANEOUS ONCE
Status: DISCONTINUED | OUTPATIENT
Start: 2021-11-14 | End: 2021-11-17 | Stop reason: HOSPADM

## 2021-11-14 RX ORDER — HYDROMORPHONE HYDROCHLORIDE 2 MG/1
2 TABLET ORAL EVERY 4 HOURS PRN
Status: DISCONTINUED | OUTPATIENT
Start: 2021-11-14 | End: 2021-11-17 | Stop reason: HOSPADM

## 2021-11-14 RX ORDER — LIDOCAINE 4 G/G
2 PATCH TOPICAL
Status: DISCONTINUED | OUTPATIENT
Start: 2021-11-14 | End: 2021-11-17 | Stop reason: HOSPADM

## 2021-11-14 RX ORDER — METHOCARBAMOL 100 MG/ML
1000 INJECTION, SOLUTION INTRAMUSCULAR; INTRAVENOUS ONCE
Status: COMPLETED | OUTPATIENT
Start: 2021-11-14 | End: 2021-11-14

## 2021-11-14 RX ORDER — HYDROMORPHONE HYDROCHLORIDE 1 MG/ML
0.3 INJECTION, SOLUTION INTRAMUSCULAR; INTRAVENOUS; SUBCUTANEOUS ONCE
Status: COMPLETED | OUTPATIENT
Start: 2021-11-14 | End: 2021-11-14

## 2021-11-14 RX ADMIN — HYDROXYZINE HYDROCHLORIDE 25 MG: 25 TABLET, FILM COATED ORAL at 18:33

## 2021-11-14 RX ADMIN — HYDROMORPHONE HYDROCHLORIDE 0.3 MG: 1 INJECTION, SOLUTION INTRAMUSCULAR; INTRAVENOUS; SUBCUTANEOUS at 19:09

## 2021-11-14 RX ADMIN — PIPERACILLIN AND TAZOBACTAM 3.38 G: 3; .375 INJECTION, POWDER, LYOPHILIZED, FOR SOLUTION INTRAVENOUS at 19:49

## 2021-11-14 RX ADMIN — POLYETHYLENE GLYCOL 3350 17 G: 17 POWDER, FOR SOLUTION ORAL at 08:05

## 2021-11-14 RX ADMIN — ENOXAPARIN SODIUM 40 MG: 40 INJECTION SUBCUTANEOUS at 19:49

## 2021-11-14 RX ADMIN — POLYETHYLENE GLYCOL 3350 17 G: 17 POWDER, FOR SOLUTION ORAL at 19:49

## 2021-11-14 RX ADMIN — POTASSIUM CHLORIDE, DEXTROSE MONOHYDRATE AND SODIUM CHLORIDE: 150; 5; 900 INJECTION, SOLUTION INTRAVENOUS at 12:28

## 2021-11-14 RX ADMIN — METHOCARBAMOL 500 MG: 500 TABLET ORAL at 18:33

## 2021-11-14 RX ADMIN — IBUPROFEN 400 MG: 200 TABLET, FILM COATED ORAL at 18:33

## 2021-11-14 RX ADMIN — HYDROMORPHONE HYDROCHLORIDE 2 MG: 2 TABLET ORAL at 19:49

## 2021-11-14 RX ADMIN — METHOCARBAMOL 1000 MG: 100 INJECTION, SOLUTION INTRAMUSCULAR; INTRAVENOUS at 22:15

## 2021-11-14 RX ADMIN — ACETAMINOPHEN 975 MG: 325 TABLET, FILM COATED ORAL at 12:28

## 2021-11-14 RX ADMIN — LIDOCAINE 2 PATCH: 560 PATCH PERCUTANEOUS; TOPICAL; TRANSDERMAL at 22:14

## 2021-11-14 RX ADMIN — OXYCODONE HYDROCHLORIDE 10 MG: 5 TABLET ORAL at 06:26

## 2021-11-14 RX ADMIN — HYDROXYZINE HYDROCHLORIDE 25 MG: 25 TABLET, FILM COATED ORAL at 08:05

## 2021-11-14 RX ADMIN — HYDROMORPHONE HYDROCHLORIDE 2 MG: 2 TABLET ORAL at 08:04

## 2021-11-14 RX ADMIN — ACETAMINOPHEN 975 MG: 325 TABLET, FILM COATED ORAL at 08:04

## 2021-11-14 RX ADMIN — PIPERACILLIN AND TAZOBACTAM 3.38 G: 3; .375 INJECTION, POWDER, LYOPHILIZED, FOR SOLUTION INTRAVENOUS at 14:27

## 2021-11-14 RX ADMIN — METHOCARBAMOL 500 MG: 500 TABLET ORAL at 03:09

## 2021-11-14 RX ADMIN — PIPERACILLIN AND TAZOBACTAM 3.38 G: 3; .375 INJECTION, POWDER, LYOPHILIZED, FOR SOLUTION INTRAVENOUS at 08:08

## 2021-11-14 RX ADMIN — ACETAMINOPHEN 975 MG: 325 TABLET, FILM COATED ORAL at 15:45

## 2021-11-14 RX ADMIN — HYDROMORPHONE HYDROCHLORIDE 2 MG: 2 TABLET ORAL at 15:45

## 2021-11-14 RX ADMIN — PIPERACILLIN AND TAZOBACTAM 3.38 G: 3; .375 INJECTION, POWDER, LYOPHILIZED, FOR SOLUTION INTRAVENOUS at 03:00

## 2021-11-14 ASSESSMENT — ACTIVITIES OF DAILY LIVING (ADL)
ADLS_ACUITY_SCORE: 5
ADLS_ACUITY_SCORE: 5
ADLS_ACUITY_SCORE: 3
ADLS_ACUITY_SCORE: 3
ADLS_ACUITY_SCORE: 5
ADLS_ACUITY_SCORE: 3
ADLS_ACUITY_SCORE: 5

## 2021-11-14 NOTE — UTILIZATION REVIEW
Admission Status; Secondary Review Determination       Under the authority of the Utilization Management Committee, the utilization review process indicated a secondary review on the above patient. The review outcome is based on review of the medical records, discussions with staff, and applying clinical experience noted on the date of the review.     (x) Inpatient Status Appropriate - This patient's medical care is consistent with medical management for inpatient care and reasonable inpatient medical practice.     RATIONALE FOR DETERMINATION   20 year old female POD# 1 from abdominal washout (11/13/21) s/p bile leak from preivous subtotal johnnie 11/9 with Dr. Beckett at [facility]. Drain removal on 11/10/21 prompted abdominal pain and nausea which brought her to the ED. Imaging at that time showed small fluid collection, possibly hematoma vs expected gallbladder contents s/p subtotal. On admission to [facility] pt had increasing WBC count, altered mental status, and further CT imaging concerning for bile leak. On 11/13/21 pt was taken back to OR for washout and drain placement.    This is a significant postop complication, patient will require OMAR drain and intravenous broad-spectrum antibiotic coverage until 11/17.  The expected length of stay at the time of admission was more than 2 nights because of the severity of illness, intensity of service provided, and risk for adverse outcome. Inpatient admission is appropriate.   Dr. Watkins notified  This document was produced using voice recognition software       The information on this document is developed by the utilization review team in order for the business office to ensure compliance. This only denotes the appropriateness of proper admission status and does not reflect the quality of care rendered.   The definitions of Inpatient Status and Observation Status used in making the determination above are those provided in the CMS Coverage Manual, Chapter 1 and  Chapter 6, section 70.4.   Sincerely,   WOLF WEINER MD   System Medical Director   Utilization Management   [facility].

## 2021-11-14 NOTE — PLAN OF CARE
Temp: 98.8  F (37.1  C) Temp src: Oral BP: 119/74 Pulse: 91   Resp: 28 SpO2: 96 % O2 Device: None (Room air) Oxygen Delivery: 1 LPM     Time: 3798-6809  Activity: SBA, ambulates to bathroom   Pain: c/o OMAR site pain, given scheduled tylenol w/ some reported relief.    Neuro: A&O x4   Cardiac: WNL denies chest pain   Respiratory: Satting mid 90's on RA. Denies SOB.  GI/: Denies nausea. Voids spontaneously not saving. Suppository given, x1 large BM   Diet: full liquid diet    Lines: L PIV running D5, NS w/ K+ 20 mEq 100ml/hr.   Incisions/Drains/Skin: x2 lap sites, x1 OMAR drain bile output large amounts, team aware.   Lab: reviewed   Plan: Continue w/ POC

## 2021-11-14 NOTE — PROGRESS NOTES
Surgery Progress Note  11/14/2021       Subjective:  - NAVNEET overnight.  - BM with suppository this am   - Pt ready to try diet   - Pt stating pain not well controlled with oxy     Objective:  Temp:  [97.8  F (36.6  C)-101  F (38.3  C)] 97.8  F (36.6  C)  Pulse:  [] 91  Resp:  [16-28] 16  BP: (109-144)/(68-91) 109/68  SpO2:  [94 %-99 %] 98 %    I/O last 3 completed shifts:  In: 4850.67 [P.O.:60; I.V.:4771.67; IV Piggyback:19]  Out: 765 [Urine:300; Drains:465]      Gen: Awake, alert, NAD  Resp: NLB on RA or 1 LPM  Abd: soft, nondistended, nontender, x1 OMAR drain serosanguinous output.   Incision: c/d/I  Ext: WWP, no edema     Labs:  Recent Labs   Lab 11/13/21  0657 11/12/21  0725 11/11/21  0026   WBC 16.3* 10.9 12.4*   HGB 11.5* 10.6* 12.0    337 385       Recent Labs   Lab 11/13/21  1319 11/13/21  0657 11/11/21  0026   NA  --  137 138   POTASSIUM  --  3.8 4.0   CHLORIDE  --  107 105   CO2  --  26 28   BUN  --  6* 7   CR  --  0.54 0.74   * 114* 104*   YOVANI  --  8.2* 8.8       Imaging:  CT: 11/13/21 - increasing r. abdl fluid   CT: 11/11/21 - post-surgical hematoma, complex air in gall bladder fossa,      Assessment/Plan:   20 year old female POD# 1 from abdominal washout (11/13/21) s/p bile leak from preivous subtotal johnnie 11/9 with [facility]. Drain removal on 11/10/21 prompted abdominal pain and nausea which brought her to the ED. Imaging at that time showed small fluid collection, possibly hematoma vs expected gallbladder contents s/p subtotal. On admission to [facility] pt had increasing WBC count, altered mental status, and further CT imaging concerning for bile leak. On 11/13/21 pt was taken back to OR for washout and drain placement.     Neuro: PO dilaudid 2mg,   Activity:Encourage ambulation  GI: Can advance diet as tolerated   -Can cont MIVF until tolerating adequate PO  -Cont OMAR drain on disposition   ID: Zosyn 4 days post-op (until 11/17)   PPx: Lovenox  40mg q24h    Seen,  examined, and discussed with chief resident, who will discuss with staff.    Fausto Watkins   PGY 1 Surgery

## 2021-11-14 NOTE — PROVIDER NOTIFICATION
Paged team about patient not having results from given suppository, can an enema be ordered?    Addendum: patient was able to have x1 large BM after page

## 2021-11-15 LAB
ALBUMIN SERPL-MCNC: 1.8 G/DL (ref 3.4–5)
ALP SERPL-CCNC: 162 U/L (ref 40–150)
ALT SERPL W P-5'-P-CCNC: 42 U/L (ref 0–50)
ANION GAP SERPL CALCULATED.3IONS-SCNC: 5 MMOL/L (ref 3–14)
AST SERPL W P-5'-P-CCNC: 18 U/L (ref 0–45)
BILIRUB DIRECT SERPL-MCNC: 0.3 MG/DL (ref 0–0.2)
BILIRUB SERPL-MCNC: 0.6 MG/DL (ref 0.2–1.3)
BUN SERPL-MCNC: 7 MG/DL (ref 7–30)
CALCIUM SERPL-MCNC: 7.7 MG/DL (ref 8.5–10.1)
CHLORIDE BLD-SCNC: 112 MMOL/L (ref 94–109)
CO2 SERPL-SCNC: 25 MMOL/L (ref 20–32)
CREAT SERPL-MCNC: 0.58 MG/DL (ref 0.52–1.04)
ERYTHROCYTE [DISTWIDTH] IN BLOOD BY AUTOMATED COUNT: 13.9 % (ref 10–15)
GFR SERPL CREATININE-BSD FRML MDRD: >90 ML/MIN/1.73M2
GLUCOSE BLD-MCNC: 124 MG/DL (ref 70–99)
HCT VFR BLD AUTO: 30.3 % (ref 35–47)
HGB BLD-MCNC: 9.9 G/DL (ref 11.7–15.7)
MCH RBC QN AUTO: 23.5 PG (ref 26.5–33)
MCHC RBC AUTO-ENTMCNC: 32.7 G/DL (ref 31.5–36.5)
MCV RBC AUTO: 72 FL (ref 78–100)
PLATELET # BLD AUTO: 489 10E3/UL (ref 150–450)
POTASSIUM BLD-SCNC: 3.7 MMOL/L (ref 3.4–5.3)
PROT SERPL-MCNC: 5.8 G/DL (ref 6.8–8.8)
RBC # BLD AUTO: 4.21 10E6/UL (ref 3.8–5.2)
SODIUM SERPL-SCNC: 142 MMOL/L (ref 133–144)
WBC # BLD AUTO: 13.8 10E3/UL (ref 4–11)

## 2021-11-15 PROCEDURE — 36415 COLL VENOUS BLD VENIPUNCTURE: CPT

## 2021-11-15 PROCEDURE — 250N000013 HC RX MED GY IP 250 OP 250 PS 637

## 2021-11-15 PROCEDURE — 250N000011 HC RX IP 250 OP 636: Performed by: STUDENT IN AN ORGANIZED HEALTH CARE EDUCATION/TRAINING PROGRAM

## 2021-11-15 PROCEDURE — 80076 HEPATIC FUNCTION PANEL: CPT

## 2021-11-15 PROCEDURE — 999N000128 HC STATISTIC PERIPHERAL IV START W/O US GUIDANCE

## 2021-11-15 PROCEDURE — 85027 COMPLETE CBC AUTOMATED: CPT

## 2021-11-15 PROCEDURE — 82374 ASSAY BLOOD CARBON DIOXIDE: CPT

## 2021-11-15 PROCEDURE — 120N000002 HC R&B MED SURG/OB UMMC

## 2021-11-15 RX ORDER — GABAPENTIN 300 MG/1
300 CAPSULE ORAL 3 TIMES DAILY
Status: DISCONTINUED | OUTPATIENT
Start: 2021-11-15 | End: 2021-11-17 | Stop reason: HOSPADM

## 2021-11-15 RX ADMIN — HYDROMORPHONE HYDROCHLORIDE 2 MG: 2 TABLET ORAL at 01:44

## 2021-11-15 RX ADMIN — ACETAMINOPHEN 975 MG: 325 TABLET, FILM COATED ORAL at 20:01

## 2021-11-15 RX ADMIN — GABAPENTIN 300 MG: 300 CAPSULE ORAL at 14:15

## 2021-11-15 RX ADMIN — ONDANSETRON 4 MG: 4 TABLET, ORALLY DISINTEGRATING ORAL at 06:28

## 2021-11-15 RX ADMIN — ACETAMINOPHEN 975 MG: 325 TABLET, FILM COATED ORAL at 15:33

## 2021-11-15 RX ADMIN — PIPERACILLIN AND TAZOBACTAM 3.38 G: 3; .375 INJECTION, POWDER, LYOPHILIZED, FOR SOLUTION INTRAVENOUS at 08:08

## 2021-11-15 RX ADMIN — IBUPROFEN 400 MG: 200 TABLET, FILM COATED ORAL at 04:24

## 2021-11-15 RX ADMIN — HYDROMORPHONE HYDROCHLORIDE 2 MG: 2 TABLET ORAL at 05:51

## 2021-11-15 RX ADMIN — GABAPENTIN 300 MG: 300 CAPSULE ORAL at 08:07

## 2021-11-15 RX ADMIN — HYDROXYZINE HYDROCHLORIDE 50 MG: 25 TABLET, FILM COATED ORAL at 01:53

## 2021-11-15 RX ADMIN — PIPERACILLIN AND TAZOBACTAM 3.38 G: 3; .375 INJECTION, POWDER, LYOPHILIZED, FOR SOLUTION INTRAVENOUS at 01:45

## 2021-11-15 RX ADMIN — LIDOCAINE 1 PATCH: 560 PATCH PERCUTANEOUS; TOPICAL; TRANSDERMAL at 20:01

## 2021-11-15 RX ADMIN — POLYETHYLENE GLYCOL 3350 17 G: 17 POWDER, FOR SOLUTION ORAL at 10:04

## 2021-11-15 RX ADMIN — ENOXAPARIN SODIUM 40 MG: 40 INJECTION SUBCUTANEOUS at 20:01

## 2021-11-15 RX ADMIN — HYDROMORPHONE HYDROCHLORIDE 2 MG: 2 TABLET ORAL at 15:33

## 2021-11-15 RX ADMIN — GABAPENTIN 300 MG: 300 CAPSULE ORAL at 20:01

## 2021-11-15 RX ADMIN — METHOCARBAMOL 500 MG: 500 TABLET ORAL at 09:56

## 2021-11-15 RX ADMIN — METHOCARBAMOL 500 MG: 500 TABLET ORAL at 01:44

## 2021-11-15 RX ADMIN — PIPERACILLIN AND TAZOBACTAM 3.38 G: 3; .375 INJECTION, POWDER, LYOPHILIZED, FOR SOLUTION INTRAVENOUS at 20:01

## 2021-11-15 RX ADMIN — HYDROMORPHONE HYDROCHLORIDE 2 MG: 2 TABLET ORAL at 09:56

## 2021-11-15 RX ADMIN — ACETAMINOPHEN 975 MG: 325 TABLET, FILM COATED ORAL at 12:01

## 2021-11-15 RX ADMIN — ACETAMINOPHEN 975 MG: 325 TABLET, FILM COATED ORAL at 08:03

## 2021-11-15 RX ADMIN — POLYETHYLENE GLYCOL 3350 17 G: 17 POWDER, FOR SOLUTION ORAL at 20:01

## 2021-11-15 RX ADMIN — METHOCARBAMOL 500 MG: 500 TABLET ORAL at 18:22

## 2021-11-15 RX ADMIN — PIPERACILLIN AND TAZOBACTAM 3.38 G: 3; .375 INJECTION, POWDER, LYOPHILIZED, FOR SOLUTION INTRAVENOUS at 14:16

## 2021-11-15 ASSESSMENT — ACTIVITIES OF DAILY LIVING (ADL)
ADLS_ACUITY_SCORE: 3
ADLS_ACUITY_SCORE: 5
ADLS_ACUITY_SCORE: 3
ADLS_ACUITY_SCORE: 5
ADLS_ACUITY_SCORE: 3
ADLS_ACUITY_SCORE: 5
ADLS_ACUITY_SCORE: 5

## 2021-11-15 NOTE — PROGRESS NOTES
Surgery Progress Note  11/15/2021       Subjective:  - Pain in arm/shoulder   - Feels nausea  - States she did not eat yesterday   - Pt stating pain not well controlled with oxy, dilaudid, and tylenol at night      Objective:  Temp:  [98.1  F (36.7  C)-99.2  F (37.3  C)] 99.2  F (37.3  C)  Pulse:  [] 103  Resp:  [17-24] 20  BP: (110-138)/(75-90) 134/90  SpO2:  [97 %-99 %] 97 %    I/O last 3 completed shifts:  In: 2070 [P.O.:360; I.V.:1710]  Out: 570 [Urine:400; Drains:170]      Gen: Awake, alert, NAD  Resp: NLB on RA or 1 LPM  Abd: soft, nondistended, nontender, x1 OMAR drain serosanguinous output.   Incision: c/d/I  Ext: WWP, no edema     Labs:  Recent Labs   Lab 11/14/21  0822 11/13/21  0657 11/12/21  0725   WBC 19.4* 16.3* 10.9   HGB 9.7* 11.5* 10.6*    447 337       Recent Labs   Lab 11/14/21  0822 11/13/21  1319 11/13/21  0657 11/11/21  0026     --  137 138   POTASSIUM 3.7  --  3.8 4.0   CHLORIDE 109  --  107 105   CO2 27  --  26 28   BUN 5*  --  6* 7   CR 0.55  --  0.54 0.74   * 102* 114* 104*   YOVANI 8.1*  --  8.2* 8.8       Imaging:  CT: 11/13/21 - increasing r. abdl fluid   CT: 11/11/21 - post-surgical hematoma, complex air in gall bladder fossa,      Assessment/Plan:   20 year old female POD# 2 from abdominal washout (11/13/21) s/p bile leak from preivous subtotal johnnie 11/9 with Dr. Beckett at [facility]. Drain removal on 11/10/21 prompted abdominal pain and nausea which brought her to the ED. Imaging at that time showed small fluid collection, possibly hematoma vs expected gallbladder contents s/p subtotal. On admission to [facility] pt had increasing WBC count, altered mental status, and further CT imaging concerning for bile leak. On 11/13/21 pt was taken back to OR for washout and drain placement.     Neuro/Pain: PO dilaudid 2mg, tylenol, robaxin, lidocaine patch, gabapentin TID   Activity:Encourage ambulation  GI: Can advance diet as tolerated        Discontinue MIVF        Cont  OMAR drain on disposition   ID: Zosyn 4 days post-op (until 11/17)   PPx: Lovenox  40mg q24h    Seen, examined, and discussed with chief resident, who will discuss with staff.    Fausto Watkins   PGY 1 Surgery

## 2021-11-15 NOTE — PROVIDER NOTIFICATION
Paged team Patient is c/o intense L shoulder pain. Oral dilaudid given hour ago, tried ice pack w/ no reported relief. Vitals are stable.       Papi Sheehan from team came to see patient. 1 time dose of Robaxin IV and x2 lidocaine patches ordered for shoulder and abd pain.

## 2021-11-15 NOTE — PROVIDER NOTIFICATION
Paged team about patient's pain remains poorly controlled. Gave ibuprofen 1 hour ago and patient is still reporting severe pain in L shoulder and flank. Dilaudid not avail. until 05:44.    Addendum: Spoke w/ Papi Sheehan, was told team will round soon and make necessary  adjustments.

## 2021-11-15 NOTE — PLAN OF CARE
Time: 12 hour day shift 11/15  Status: Stable and improving  NEURO: Alert and oriented 4  RESPIRATORY: Lungs clear, diminished in bases. O2 sats 97% on RA.  CARDIAC: HR  and regular   GI/: Abdomen rounded, soft. Lap sites x2 with scant drainage. Omar site with scant drainage. Dressings changed.  DIET: Tolerating regular diet. No nausea.  PAIN/NAUSEA: Dilaudid 2 mg tablet given x2. Robaxin given and scheduled Tylenol.  INCISION/DRAIN: OMAR with large output. Drainage alfonzo colored. Needs to be checked approximately every 2 hours.  IV ACCESS: PIV replaced by vascular access.  ACTIVITY: Walked long distance in [place] accompanied by NA/  LAB: Albummin 1,8, WBC 13.8, HGB 9.9, plts 85  PLAN: Continue to monitor VS, incisions and drain. Encourage IS and ambulation.

## 2021-11-15 NOTE — PLAN OF CARE
"/75 (BP Location: Right arm)   Pulse 94   Temp 98.8  F (37.1  C) (Axillary)   Resp 24   Ht 1.702 m (5' 7\")   Wt 89.4 kg (197 lb)   SpO2 99%   BMI 30.85 kg/m    Status: POD 1 bile leak repair after cholecystectomy at OSH   Pain/Nausea: pain controlled with PO dilaudid x2 until 1840 - pt c/o severe pain now radiating to LUQ(previously pain located near R OMAR site). IV dilaudid 0.3 ordered, team paged  Mobility: SBA  Diet: Regular, tolerated  Labs: Reviewed  Lines: X - PIV leaking, STAT order placed so IV dilaudid can be given  Drains: R OMAR draining bile  Skin/Incisions: lap sites x2 from this surgery covered with primapore, dermabonded umbilical incision from johnnie  Neuro: A&Ox4  Respiratory: WDL on room air  New Changes: increase in pain - one time dose dilaudid given; triggered SIRS protocol, lactic X  Plan:      "

## 2021-11-15 NOTE — PLAN OF CARE
Temp: 98.8  F (37.1  C) Temp src: Oral BP: 132/81 Pulse: 91   Resp: 20 SpO2: 97 % O2 Device: None (Room air)       Time: 8351-0148  Activity: SBA, ambulates to bathroom   Pain: c/o intense L shoulder pain early in shift PRN dilaudid and ice pack given w/ no reported relief. Team paged about pain being unrelieved see note. IV robaxin given x1 and pain patches placed per order, reported some relief. Received ibuprofen PRN later in shift x1.  Neuro: A&O x4   Cardiac: WNL denies chest pain   Respiratory: Satting upper 90's on RA. Denies SOB.  GI/: Denies nausea. Voids spontaneously not saving. N BM this shift. + flatus   Diet: regular diet  Lines: R PIV running D5, NS w/ K+ 20 mEq 100ml/hr.   Incisions/Drains/Skin: x2 lap sites covered w/ primapore, x1 OMAR drain bile output, leaking at site, dressing reinforced.   Lab: lactic was 1.0   Plan: Continue w/ POC

## 2021-11-16 ENCOUNTER — APPOINTMENT (OUTPATIENT)
Dept: GENERAL RADIOLOGY | Facility: CLINIC | Age: 21
End: 2021-11-16
Attending: SURGERY
Payer: COMMERCIAL

## 2021-11-16 ENCOUNTER — ANESTHESIA EVENT (OUTPATIENT)
Dept: SURGERY | Facility: CLINIC | Age: 21
End: 2021-11-16
Payer: COMMERCIAL

## 2021-11-16 ENCOUNTER — APPOINTMENT (OUTPATIENT)
Dept: GENERAL RADIOLOGY | Facility: CLINIC | Age: 21
End: 2021-11-16
Attending: INTERNAL MEDICINE
Payer: COMMERCIAL

## 2021-11-16 ENCOUNTER — ANESTHESIA (OUTPATIENT)
Dept: SURGERY | Facility: CLINIC | Age: 21
End: 2021-11-16
Payer: COMMERCIAL

## 2021-11-16 LAB — ERCP: NORMAL

## 2021-11-16 PROCEDURE — 99222 1ST HOSP IP/OBS MODERATE 55: CPT | Performed by: PHYSICIAN ASSISTANT

## 2021-11-16 PROCEDURE — 710N000010 HC RECOVERY PHASE 1, LEVEL 2, PER MIN: Performed by: INTERNAL MEDICINE

## 2021-11-16 PROCEDURE — 250N000011 HC RX IP 250 OP 636: Performed by: NURSE ANESTHETIST, CERTIFIED REGISTERED

## 2021-11-16 PROCEDURE — 250N000013 HC RX MED GY IP 250 OP 250 PS 637

## 2021-11-16 PROCEDURE — C1877 STENT, NON-COAT/COV W/O DEL: HCPCS | Performed by: INTERNAL MEDICINE

## 2021-11-16 PROCEDURE — 250N000025 HC SEVOFLURANE, PER MIN: Performed by: INTERNAL MEDICINE

## 2021-11-16 PROCEDURE — 120N000002 HC R&B MED SURG/OB UMMC

## 2021-11-16 PROCEDURE — C1769 GUIDE WIRE: HCPCS | Performed by: INTERNAL MEDICINE

## 2021-11-16 PROCEDURE — 258N000001 HC RX 258: Performed by: STUDENT IN AN ORGANIZED HEALTH CARE EDUCATION/TRAINING PROGRAM

## 2021-11-16 PROCEDURE — 255N000002 HC RX 255 OP 636: Performed by: INTERNAL MEDICINE

## 2021-11-16 PROCEDURE — 999N000141 HC STATISTIC PRE-PROCEDURE NURSING ASSESSMENT: Performed by: INTERNAL MEDICINE

## 2021-11-16 PROCEDURE — 360N000082 HC SURGERY LEVEL 2 W/ FLUORO, PER MIN: Performed by: INTERNAL MEDICINE

## 2021-11-16 PROCEDURE — 999N000179 XR SURGERY CARM FLUORO LESS THAN 5 MIN W STILLS: Mod: TC

## 2021-11-16 PROCEDURE — 250N000009 HC RX 250: Performed by: NURSE ANESTHETIST, CERTIFIED REGISTERED

## 2021-11-16 PROCEDURE — 250N000011 HC RX IP 250 OP 636: Performed by: INTERNAL MEDICINE

## 2021-11-16 PROCEDURE — 250N000013 HC RX MED GY IP 250 OP 250 PS 637: Performed by: NURSE ANESTHETIST, CERTIFIED REGISTERED

## 2021-11-16 PROCEDURE — 0F7D8DZ DILATION OF PANCREATIC DUCT WITH INTRALUMINAL DEVICE, VIA NATURAL OR ARTIFICIAL OPENING ENDOSCOPIC: ICD-10-PCS | Performed by: INTERNAL MEDICINE

## 2021-11-16 PROCEDURE — 250N000009 HC RX 250: Performed by: STUDENT IN AN ORGANIZED HEALTH CARE EDUCATION/TRAINING PROGRAM

## 2021-11-16 PROCEDURE — 370N000017 HC ANESTHESIA TECHNICAL FEE, PER MIN: Performed by: INTERNAL MEDICINE

## 2021-11-16 PROCEDURE — 0F798DZ DILATION OF COMMON BILE DUCT WITH INTRALUMINAL DEVICE, VIA NATURAL OR ARTIFICIAL OPENING ENDOSCOPIC: ICD-10-PCS | Performed by: INTERNAL MEDICINE

## 2021-11-16 PROCEDURE — C1726 CATH, BAL DIL, NON-VASCULAR: HCPCS | Performed by: INTERNAL MEDICINE

## 2021-11-16 PROCEDURE — 272N000001 HC OR GENERAL SUPPLY STERILE: Performed by: INTERNAL MEDICINE

## 2021-11-16 PROCEDURE — 250N000011 HC RX IP 250 OP 636: Performed by: STUDENT IN AN ORGANIZED HEALTH CARE EDUCATION/TRAINING PROGRAM

## 2021-11-16 PROCEDURE — 258N000003 HC RX IP 258 OP 636: Performed by: NURSE ANESTHETIST, CERTIFIED REGISTERED

## 2021-11-16 PROCEDURE — 250N000009 HC RX 250: Performed by: INTERNAL MEDICINE

## 2021-11-16 DEVICE — IMPLANTABLE DEVICE: Type: IMPLANTABLE DEVICE | Site: BILE DUCT | Status: FUNCTIONAL

## 2021-11-16 DEVICE — STENT FREEMAN PANCREA FLEX 4FRX11CM W/O FLANGE SGL PIGTAIL: Type: IMPLANTABLE DEVICE | Site: PANCREATIC DUCT | Status: FUNCTIONAL

## 2021-11-16 RX ORDER — ALBUTEROL SULFATE 90 UG/1
AEROSOL, METERED RESPIRATORY (INHALATION) PRN
Status: DISCONTINUED | OUTPATIENT
Start: 2021-11-16 | End: 2021-11-16

## 2021-11-16 RX ORDER — ESMOLOL HYDROCHLORIDE 10 MG/ML
INJECTION INTRAVENOUS PRN
Status: DISCONTINUED | OUTPATIENT
Start: 2021-11-16 | End: 2021-11-16

## 2021-11-16 RX ORDER — FENTANYL CITRATE 50 UG/ML
INJECTION, SOLUTION INTRAMUSCULAR; INTRAVENOUS PRN
Status: DISCONTINUED | OUTPATIENT
Start: 2021-11-16 | End: 2021-11-16

## 2021-11-16 RX ORDER — SODIUM CHLORIDE, SODIUM LACTATE, POTASSIUM CHLORIDE, CALCIUM CHLORIDE 600; 310; 30; 20 MG/100ML; MG/100ML; MG/100ML; MG/100ML
INJECTION, SOLUTION INTRAVENOUS CONTINUOUS
Status: DISCONTINUED | OUTPATIENT
Start: 2021-11-16 | End: 2021-11-16 | Stop reason: HOSPADM

## 2021-11-16 RX ORDER — FENTANYL CITRATE 50 UG/ML
50 INJECTION, SOLUTION INTRAMUSCULAR; INTRAVENOUS EVERY 5 MIN PRN
Status: DISCONTINUED | OUTPATIENT
Start: 2021-11-16 | End: 2021-11-16 | Stop reason: HOSPADM

## 2021-11-16 RX ORDER — LIDOCAINE 40 MG/G
CREAM TOPICAL
Status: DISCONTINUED | OUTPATIENT
Start: 2021-11-16 | End: 2021-11-16 | Stop reason: HOSPADM

## 2021-11-16 RX ORDER — DEXTROSE MONOHYDRATE, SODIUM CHLORIDE, AND POTASSIUM CHLORIDE 50; 1.49; 9 G/1000ML; G/1000ML; G/1000ML
INJECTION, SOLUTION INTRAVENOUS CONTINUOUS
Status: DISCONTINUED | OUTPATIENT
Start: 2021-11-16 | End: 2021-11-17

## 2021-11-16 RX ORDER — DEXAMETHASONE SODIUM PHOSPHATE 4 MG/ML
INJECTION, SOLUTION INTRA-ARTICULAR; INTRALESIONAL; INTRAMUSCULAR; INTRAVENOUS; SOFT TISSUE PRN
Status: DISCONTINUED | OUTPATIENT
Start: 2021-11-16 | End: 2021-11-16

## 2021-11-16 RX ORDER — INDOMETHACIN 50 MG/1
100 SUPPOSITORY RECTAL
Status: DISCONTINUED | OUTPATIENT
Start: 2021-11-16 | End: 2021-11-16 | Stop reason: HOSPADM

## 2021-11-16 RX ORDER — PROPOFOL 10 MG/ML
INJECTION, EMULSION INTRAVENOUS PRN
Status: DISCONTINUED | OUTPATIENT
Start: 2021-11-16 | End: 2021-11-16

## 2021-11-16 RX ORDER — LABETALOL HYDROCHLORIDE 5 MG/ML
10 INJECTION, SOLUTION INTRAVENOUS
Status: DISCONTINUED | OUTPATIENT
Start: 2021-11-16 | End: 2021-11-16 | Stop reason: HOSPADM

## 2021-11-16 RX ORDER — ONDANSETRON 2 MG/ML
INJECTION INTRAMUSCULAR; INTRAVENOUS PRN
Status: DISCONTINUED | OUTPATIENT
Start: 2021-11-16 | End: 2021-11-16

## 2021-11-16 RX ORDER — LIDOCAINE HYDROCHLORIDE 20 MG/ML
INJECTION, SOLUTION INFILTRATION; PERINEURAL PRN
Status: DISCONTINUED | OUTPATIENT
Start: 2021-11-16 | End: 2021-11-16

## 2021-11-16 RX ORDER — INDOMETHACIN 50 MG/1
SUPPOSITORY RECTAL PRN
Status: DISCONTINUED | OUTPATIENT
Start: 2021-11-16 | End: 2021-11-16 | Stop reason: HOSPADM

## 2021-11-16 RX ORDER — IOPAMIDOL 510 MG/ML
INJECTION, SOLUTION INTRAVASCULAR PRN
Status: DISCONTINUED | OUTPATIENT
Start: 2021-11-16 | End: 2021-11-16 | Stop reason: HOSPADM

## 2021-11-16 RX ORDER — ONDANSETRON 4 MG/1
4 TABLET, ORALLY DISINTEGRATING ORAL EVERY 30 MIN PRN
Status: DISCONTINUED | OUTPATIENT
Start: 2021-11-16 | End: 2021-11-16 | Stop reason: HOSPADM

## 2021-11-16 RX ORDER — EPINEPHRINE IN SOD CHLOR,ISO 1 MG/10 ML
SYRINGE (ML) INTRAVENOUS PRN
Status: DISCONTINUED | OUTPATIENT
Start: 2021-11-16 | End: 2021-11-16 | Stop reason: HOSPADM

## 2021-11-16 RX ORDER — ALBUTEROL SULFATE 0.83 MG/ML
2.5 SOLUTION RESPIRATORY (INHALATION) ONCE
Status: COMPLETED | OUTPATIENT
Start: 2021-11-16 | End: 2021-11-16

## 2021-11-16 RX ORDER — OXYCODONE HYDROCHLORIDE 5 MG/1
5 TABLET ORAL EVERY 4 HOURS PRN
Status: DISCONTINUED | OUTPATIENT
Start: 2021-11-16 | End: 2021-11-16 | Stop reason: HOSPADM

## 2021-11-16 RX ORDER — SODIUM CHLORIDE, SODIUM LACTATE, POTASSIUM CHLORIDE, CALCIUM CHLORIDE 600; 310; 30; 20 MG/100ML; MG/100ML; MG/100ML; MG/100ML
INJECTION, SOLUTION INTRAVENOUS CONTINUOUS PRN
Status: DISCONTINUED | OUTPATIENT
Start: 2021-11-16 | End: 2021-11-16

## 2021-11-16 RX ORDER — HYDROMORPHONE HCL IN WATER/PF 6 MG/30 ML
0.4 PATIENT CONTROLLED ANALGESIA SYRINGE INTRAVENOUS EVERY 5 MIN PRN
Status: DISCONTINUED | OUTPATIENT
Start: 2021-11-16 | End: 2021-11-16 | Stop reason: HOSPADM

## 2021-11-16 RX ORDER — ONDANSETRON 2 MG/ML
4 INJECTION INTRAMUSCULAR; INTRAVENOUS EVERY 30 MIN PRN
Status: DISCONTINUED | OUTPATIENT
Start: 2021-11-16 | End: 2021-11-16 | Stop reason: HOSPADM

## 2021-11-16 RX ADMIN — LIDOCAINE HYDROCHLORIDE 100 MG: 20 INJECTION, SOLUTION INFILTRATION; PERINEURAL at 11:52

## 2021-11-16 RX ADMIN — PIPERACILLIN AND TAZOBACTAM 3.38 G: 3; .375 INJECTION, POWDER, LYOPHILIZED, FOR SOLUTION INTRAVENOUS at 02:05

## 2021-11-16 RX ADMIN — FENTANYL CITRATE 100 MCG: 50 INJECTION, SOLUTION INTRAMUSCULAR; INTRAVENOUS at 12:46

## 2021-11-16 RX ADMIN — PIPERACILLIN AND TAZOBACTAM 3.38 G: 3; .375 INJECTION, POWDER, LYOPHILIZED, FOR SOLUTION INTRAVENOUS at 20:13

## 2021-11-16 RX ADMIN — SODIUM CHLORIDE, POTASSIUM CHLORIDE, SODIUM LACTATE AND CALCIUM CHLORIDE: 600; 310; 30; 20 INJECTION, SOLUTION INTRAVENOUS at 11:46

## 2021-11-16 RX ADMIN — ALBUTEROL SULFATE 2.5 MG: 2.5 SOLUTION RESPIRATORY (INHALATION) at 10:25

## 2021-11-16 RX ADMIN — ALBUTEROL SULFATE 4 PUFF: 108 INHALANT RESPIRATORY (INHALATION) at 12:37

## 2021-11-16 RX ADMIN — POTASSIUM CHLORIDE, DEXTROSE MONOHYDRATE AND SODIUM CHLORIDE: 150; 5; 900 INJECTION, SOLUTION INTRAVENOUS at 16:21

## 2021-11-16 RX ADMIN — ROCURONIUM BROMIDE 50 MG: 50 INJECTION, SOLUTION INTRAVENOUS at 11:52

## 2021-11-16 RX ADMIN — DEXAMETHASONE SODIUM PHOSPHATE 6 MG: 4 INJECTION, SOLUTION INTRA-ARTICULAR; INTRALESIONAL; INTRAMUSCULAR; INTRAVENOUS; SOFT TISSUE at 12:41

## 2021-11-16 RX ADMIN — GLUCAGON HYDROCHLORIDE 0.4 MG: KIT at 12:27

## 2021-11-16 RX ADMIN — MIDAZOLAM 2 MG: 1 INJECTION INTRAMUSCULAR; INTRAVENOUS at 11:40

## 2021-11-16 RX ADMIN — ROCURONIUM BROMIDE 20 MG: 50 INJECTION, SOLUTION INTRAVENOUS at 12:31

## 2021-11-16 RX ADMIN — SUGAMMADEX 200 MG: 100 INJECTION, SOLUTION INTRAVENOUS at 13:03

## 2021-11-16 RX ADMIN — GABAPENTIN 300 MG: 300 CAPSULE ORAL at 09:03

## 2021-11-16 RX ADMIN — FENTANYL CITRATE 50 MCG: 50 INJECTION, SOLUTION INTRAMUSCULAR; INTRAVENOUS at 11:52

## 2021-11-16 RX ADMIN — ONDANSETRON 4 MG: 2 INJECTION INTRAMUSCULAR; INTRAVENOUS at 12:42

## 2021-11-16 RX ADMIN — ALBUTEROL SULFATE 6 PUFF: 108 INHALANT RESPIRATORY (INHALATION) at 13:04

## 2021-11-16 RX ADMIN — ACETAMINOPHEN 975 MG: 325 TABLET, FILM COATED ORAL at 09:03

## 2021-11-16 RX ADMIN — FENTANYL CITRATE 50 MCG: 50 INJECTION, SOLUTION INTRAMUSCULAR; INTRAVENOUS at 12:31

## 2021-11-16 RX ADMIN — PROPOFOL 200 MG: 10 INJECTION, EMULSION INTRAVENOUS at 11:52

## 2021-11-16 RX ADMIN — ENOXAPARIN SODIUM 40 MG: 40 INJECTION SUBCUTANEOUS at 20:12

## 2021-11-16 RX ADMIN — ESMOLOL HYDROCHLORIDE 30 MG: 10 INJECTION, SOLUTION INTRAVENOUS at 13:10

## 2021-11-16 RX ADMIN — PIPERACILLIN AND TAZOBACTAM 3.38 G: 3; .375 INJECTION, POWDER, LYOPHILIZED, FOR SOLUTION INTRAVENOUS at 09:02

## 2021-11-16 ASSESSMENT — ACTIVITIES OF DAILY LIVING (ADL)
ADLS_ACUITY_SCORE: 3

## 2021-11-16 ASSESSMENT — COPD QUESTIONNAIRES: COPD: 0

## 2021-11-16 ASSESSMENT — LIFESTYLE VARIABLES: TOBACCO_USE: 1

## 2021-11-16 NOTE — ANESTHESIA PROCEDURE NOTES
Airway       Patient location during procedure: OR       Procedure Start/Stop Times: 11/16/2021 11:54 AM  Staff -        CRNA: Karan Rodriguez APRN CRNA       Performed By: CRNAIndications and Patient Condition       Indications for airway management: hernan-procedural       Induction type:intravenous       Mask difficulty assessment: 1 - vent by mask    Final Airway Details       Final airway type: endotracheal airway       Successful airway: ETT - single  Endotracheal Airway Details        ETT size (mm): 7.0       Cuffed: yes       Successful intubation technique: direct laryngoscopy       DL Blade Type: Daigle 2       Grade View of Cords: 1       Adjucts: stylet    Post intubation assessment        Placement verified by: capnometry, equal breath sounds and chest rise        Number of attempts at approach: 1       Number of other approaches attempted: 0       Secured with: cloth tape       Ease of procedure: easy       Dentition: Intact and Unchanged

## 2021-11-16 NOTE — ANESTHESIA PREPROCEDURE EVALUATION
Anesthesia Pre-Procedure Evaluation    Patient: Malika Stokes   MRN: [id number] : 2000        Preoperative Diagnosis: Bile leak [K83.9]    Procedure : Procedure(s):  ENDOSCOPIC RETROGRADE CHOLANGIOPANCREATOGRAPHY          History reviewed. No pertinent past medical history.   Past Surgical History:   Procedure Laterality Date     INSERT DRAIN TUBE ABDOMEN N/A 2021    Procedure: INSERTION, DRAIN, ABDOMINAL;  Surgeon: Sukumar Oliva MD;  Location: [facility] OR     IRRIGATION AND DEBRIDEMENT ABDOMEN WASHOUT, COMBINED N/A 2021    Procedure: Laparoscopic abdominal washout;  Surgeon: Sukumar Oliva MD;  Location: [facility] OR      No Known Allergies   Social History     Tobacco Use     Smoking status: Never Smoker     Smokeless tobacco: Never Used   Substance Use Topics     Alcohol use: Not Currently      Wt Readings from Last 1 Encounters:   11/10/21 89.4 kg (197 lb)        Anesthesia Evaluation   Pt has had prior anesthetic. Type: General.    History of anesthetic complications   bronchospasm after intubation 21.    ROS/MED HX  ENT/Pulmonary:     (+) tobacco use, Past use,  (-) asthma and COPD   Neurologic:  - neg neurologic ROS     Cardiovascular:     (+) -----Previous cardiac testing   Echo: Date: Results:    Stress Test: Date: Results:    ECG Reviewed: Date: 11/10 Results:  - sinus rhythm with sinus arrhythmia, 76bpm  Cath: Date: Results:      METS/Exercise Tolerance: >4 METS    Hematologic:     (+) anemia (hgb 9.9 (11/15/21)),     Musculoskeletal:  - neg musculoskeletal ROS     GI/Hepatic: Comment: - Chronic cholecystitis s/p laparoscopic subtotal cholecystectomy ()      Renal/Genitourinary:  - neg Renal ROS     Endo:     (+) Obesity (BMI 31),     Psychiatric/Substance Use:       Infectious Disease:       Malignancy:  - neg malignancy ROS     Other:     (-) Any chance pregnant       Physical Exam    Airway        Mallampati: II   TM distance: > 3 FB   Neck ROM: full   Mouth  opening: > 3 cm    Respiratory Devices and Support         Dental           Cardiovascular             Pulmonary                   OUTSIDE LABS:  CBC:   Lab Results   Component Value Date    WBC 13.8 (H) 11/15/2021    WBC 19.4 (H) 11/14/2021    HGB 9.9 (L) 11/15/2021    HGB 9.7 (L) 11/14/2021    HCT 30.3 (L) 11/15/2021    HCT 29.3 (L) 11/14/2021     (H) 11/15/2021     11/14/2021     BMP:   Lab Results   Component Value Date     11/15/2021     11/14/2021    POTASSIUM 3.7 11/15/2021    POTASSIUM 3.7 11/14/2021    CHLORIDE 112 (H) 11/15/2021    CHLORIDE 109 11/14/2021    CO2 25 11/15/2021    CO2 27 11/14/2021    BUN 7 11/15/2021    BUN 5 (L) 11/14/2021    CR 0.58 11/15/2021    CR 0.55 11/14/2021     (H) 11/15/2021     (H) 11/14/2021     COAGS: No results found for: PTT, INR, FIBR  POC:   Lab Results   Component Value Date    HCGS Negative 11/11/2021     HEPATIC:   Lab Results   Component Value Date    ALBUMIN 1.8 (L) 11/15/2021    PROTTOTAL 5.8 (L) 11/15/2021    ALT 42 11/15/2021    AST 18 11/15/2021    ALKPHOS 162 (H) 11/15/2021    BILITOTAL 0.6 11/15/2021     OTHER:   Lab Results   Component Value Date    LACT 1.0 11/14/2021    YOVANI 7.7 (L) 11/15/2021    LIPASE 216 11/13/2021    .0 (H) 11/13/2021       Anesthesia Plan    ASA Status:  2   NPO Status:  NPO Appropriate    Anesthesia Type: General.     - Airway: ETT   Induction: Intravenous.   Maintenance: Balanced.   Techniques and Equipment:       Drips/Meds: albuterol neb in preop.     Consents    Anesthesia Plan(s) and associated risks, benefits, and realistic alternatives discussed. Questions answered and patient/representative(s) expressed understanding.     - Discussed with:  Patient         Postoperative Care    Pain management: IV analgesics, Oral pain medications.   PONV prophylaxis: Ondansetron (or other 5HT-3), Dexamethasone or Solumedrol     Comments:         H&P reviewed: Unable to attach H&P to encounter due to  EHR limitations. H&P Update: appropriate H&P reviewed, patient examined. No interval changes since H&P (within 30 days).         Roma Quintanilla MD

## 2021-11-16 NOTE — ANESTHESIA CARE TRANSFER NOTE
Patient: Malika Stokes    Procedure: Procedure(s):  ENDOSCOPIC RETROGRADE CHOLANGIOPANCREATOGRAPHY, WITH BILIARY AND PANCREATIC STENT INSERTION. BILIARY SPHINCTEROTOMY.       Diagnosis: Bile leak [K83.9]  Diagnosis Additional Information: No value filed.    Anesthesia Type:   General     Note:    Oropharynx: oropharynx clear of all foreign objects and spontaneously breathing  Level of Consciousness: drowsy  Oxygen Supplementation: nasal cannula  Level of Supplemental Oxygen (L/min / FiO2): 6  Independent Airway: airway patency satisfactory and stable  Dentition: dentition unchanged    Report to RN Given: handoff report given  Patient transferred to: PACU  Comments: sats 88 percent, pt states breathing fine.  Plan for FM. 93 % on FM    Handoff Report: Identifed the Patient, Identified the Reponsible Provider, Reviewed the pertinent medical history, Discussed the surgical course, Reviewed Intra-OP anesthesia mangement and issues during anesthesia, Set expectations for post-procedure period and Allowed opportunity for questions and acknowledgement of understanding      Vitals:  Vitals Value Taken Time   /86 11/16/21 1320   Temp     Pulse 109 11/16/21 1321   Resp 36 11/16/21 1321   SpO2     Vitals shown include unvalidated device data.    Electronically Signed By: SEBASTIAN Samuel CRNA  November 16, 2021  1:22 PM

## 2021-11-16 NOTE — PLAN OF CARE
Vital signs:  Temp: 98.8  F (37.1  C) Temp src: Oral BP: 123/82 Pulse: 97   Resp: 18 SpO2: 98 % O2 Device: None (Room air)     Activity: Up independently.   Neuros: A&O x4.   Cardiac: WDL.   Respiratory: WDL on RA. Denies SOB.  GI/: Voiding spontaneously. +BS, passing flatus.   Diet: Regular.   Lines: PIV TKO. IV ABX given per orders.   Incisions/Drains: Lap sites with primapore, CDI. Right OMAR with moderate output.  Pain/nausea: Denies, declined pain meds.

## 2021-11-16 NOTE — CONSULTS
Advanced Endoscopy/Pancreaticobiliary Consultation      Date of Admission:  11/10/2021  Reason for Admission: Bile leak  Date of Consult  11/16/2021   Requesting Physician:  Ricardo Carrington MD           ASSESSMENT AND RECOMMENDATIONS:   Assessment:  20 year old female who underwent subtotal cholecystectomy for acute cholecystitis at OSH on 11/9 ([facility]), developed post operative pain and nausea prompting return to ED on 11/10. Found to have bile leak based on imaging (CT scan with fluid collection) s/p RTOR and lap washout.    #. Post (subtotal) cholecystectomy bile leak   #. Bile peritonitis s/p lap washout and OMAR drain 11/13  #. Leukocytosis  CT on admission showing post-surgical hematoma vs complex collection in GB fossa. Decompensated clinically on 11/13 with AMS and worsening leukocytosis, brought back to OR for lap washout -- 600ml of bile removed from the abdomen with associated peritonitis. OMAR drain placed but with ongoing bilious output concerning for ongoing leak. GI AE consulted for consideration of ERCP for biliary decompression. Currently receiving IV Zosyn for peritonitis.     Recommendations:  ERCP today  Drain management per general surgery service  Continue NPO status   COVID status negative 11/11  Analgesia/Antiemetics per primary team  Discussed with primary team (gen surgery)    Gastroenterology follow up recommendations: TBD    Thank you for involving us in this patient's care. Please do not hesitate to contact the GI service with any questions or concerns.     Pt seen and care plan discussed with Dr. Linn, GI staff physician.    Vanessa Lozano PA-C  Advanced Endoscopy/Pancreaticobiliary GI Service  [facility]  Text Page  -------------------------------------------------------------------------------------------------------------------       Reason for Consultation:   Bile leak           History of Present Illness:   Patient seen and  examined at 0915. History is obtained from the patient.    Malika Stokes is a 20 year old female with a PMH significant for who underwent subtotal cholecystectomy for acute cholecystitis at OSH on 11/9 ([facility]), developed post operative pain and nausea prompting return to ED on 11/10. Patient reports that the pain started shortly after her drain was removed the day after surgery. She came to the ED here at [facility] 11/11 for pain control, found to have probable perioperative hematoma based on CT scan. On 11/13 she decompensated clinically with AMS and worsening leukocytosis and abdominal pain/tenderness. Repeat CT scan showed increased R sided abdominal fluid collection concerning for bile leak so was taken back to the OR on 11/13 for lap washout. 600ml of bile removed from the abdomen and e/o peritonitis. OMAR drain was placed. The drain output initially started to decrease from 395 to 170 but yesterday picked back up and put out 690 of bilious output. The patient actually reports significant decrease in abdominal pain as well as shoulder pain. Previously was having so much R shoulder pain that she could not sleep. Today has no pain. Has been NPO for potential procedure today. She otherwise is not having any chest pain, nausea, vomiting or diarrhea. Reports that she had some trouble with coughing this morning after getting up out of bed. No recent weight loss, no fever or chills.     Labs today notable for improving leukocytosis 19-->13.8. Hgb stable at 9.9, Liver tests including Alk phos is gradually improving.               Past Medical History:   Reviewed and edited as appropriate  History reviewed. No pertinent past medical history.         Past Surgical History:   Reviewed and edited as appropriate   Past Surgical History:   Procedure Laterality Date     INSERT DRAIN TUBE ABDOMEN N/A 11/13/2021    Procedure: INSERTION, DRAIN, ABDOMINAL;  Surgeon: Sukumar Oliva MD;  Location: [facility] OR      IRRIGATION AND DEBRIDEMENT ABDOMEN WASHOUT, COMBINED N/A 11/13/2021    Procedure: Laparoscopic abdominal washout;  Surgeon: Sukumar Oliva MD;  Location:  OR              Social History:   The patient lives in [place]. She has a two year old daughter         Family History:     History reviewed. No pertinent family history.          Allergies:   Reviewed and edited as appropriate   No Known Allergies         Medications:     Current Facility-Administered Medications   Medication     [Auto Hold] acetaminophen (TYLENOL) tablet 975 mg     [Auto Hold] bisacodyl (DULCOLAX) Suppository 10 mg     dextrose 5% and 0.9% NaCl with potassium chloride 20 mEq infusion     [Auto Hold] enoxaparin ANTICOAGULANT (LOVENOX) injection 40 mg     [Auto Hold] gabapentin (NEURONTIN) capsule 300 mg     [Auto Hold] HYDROmorphone (DILAUDID) tablet 2 mg     [Auto Hold] HYDROmorphone (PF) (DILAUDID) injection 0.3 mg     [Auto Hold] hydrOXYzine (ATARAX) tablet 25 mg    Or     [Auto Hold] hydrOXYzine (ATARAX) tablet 50 mg     [Auto Hold] ibuprofen (ADVIL/MOTRIN) tablet 400 mg     [Auto Hold] Lidocaine (LIDOCARE) 4 % Patch 2 patch    And     [Auto Hold] lidocaine patch in PLACE     [Auto Hold] lidocaine (LMX4) cream     [Auto Hold] lidocaine (LMX4) cream     [Auto Hold] lidocaine 1 % 0.1-1 mL     [Auto Hold] lidocaine 1 % 0.1-1 mL     [Auto Hold] methocarbamol (ROBAXIN) tablet 500 mg     [Auto Hold] naloxone (NARCAN) injection 0.2 mg    Or     [Auto Hold] naloxone (NARCAN) injection 0.4 mg    Or     [Auto Hold] naloxone (NARCAN) injection 0.2 mg    Or     [Auto Hold] naloxone (NARCAN) injection 0.4 mg     [Auto Hold] ondansetron (ZOFRAN) injection 4 mg     [Auto Hold] ondansetron (ZOFRAN-ODT) ODT tab 4 mg    Or     [Auto Hold] ondansetron (ZOFRAN) injection 4 mg     [Auto Hold] piperacillin-tazobactam (ZOSYN) 3.375 g vial to attach to  mL bag     [Auto Hold] polyethylene glycol (MIRALAX) Packet 17 g     [Auto Hold]  prochlorperazine (COMPAZINE) injection 10 mg    Or     [Auto Hold] prochlorperazine (COMPAZINE) tablet 10 mg    Or     [Auto Hold] prochlorperazine (COMPAZINE) suppository 25 mg     [Auto Hold] sodium chloride (PF) 0.9% PF flush 3 mL     [Auto Hold] sodium chloride (PF) 0.9% PF flush 3 mL     [Auto Hold] sodium chloride (PF) 0.9% PF flush 3 mL     [Auto Hold] sodium chloride (PF) 0.9% PF flush 3 mL             Review of Systems:     A complete review of systems was performed and is negative except as noted in the HPI             Physical Exam:   Temp: 98.8  F (37.1  C) Temp src: Oral BP: 123/82 Pulse: 97   Resp: 18 SpO2: 98 % O2 Device: None (Room air)    Wt:   Wt Readings from Last 2 Encounters:   11/10/21 89.4 kg (197 lb)        General: WDWN female in NAD.  Answers appropriately.    HEENT: Head is AT/NC. Sclera anicteric. No conjunctival injection.  Oropharynx is clear, moist and w/o exudate or lesions.  Lungs: Clear to auscultation bilaterally.  No wheezes, rhonchi or crackles.    Heart: Regular rate and rhythm.  No murmurs, gallops or rubs.  Normal S1 and S2.  Abdomen: Soft, non-tender, non-distended. Lap incisions CDI, covered in bandages, BS +. No rebound or peritoneal signs, drain with bilious output  Extremities: WWP, no pedal edema.  Skin: No jaundice or rash  Neurologic: Grossly non-focal.  CN 2-12 grossly intact.            Data:   Labs and imaging below were independently reviewed and interpreted    LAB WORK:    BMP  Recent Labs   Lab 11/15/21  0712 11/14/21  0822 11/13/21  1319 11/13/21  0657 11/11/21  0026    142  --  137 138   POTASSIUM 3.7 3.7  --  3.8 4.0   CHLORIDE 112* 109  --  107 105   YOVANI 7.7* 8.1*  --  8.2* 8.8   CO2 25 27  --  26 28   BUN 7 5*  --  6* 7   CR 0.58 0.55  --  0.54 0.74   * 129* 102* 114* 104*     CBC  Recent Labs   Lab 11/15/21  0712 11/14/21  0822 11/13/21  0657 11/12/21  0725   WBC 13.8* 19.4* 16.3* 10.9   RBC 4.21 4.09 4.88 4.50   HGB 9.9* 9.7* 11.5* 10.6*   HCT  30.3* 29.3* 35.6 32.8*   MCV 72* 72* 73* 73*   MCH 23.5* 23.7* 23.6* 23.6*   MCHC 32.7 33.1 32.3 32.3   RDW 13.9 13.6 13.6 13.7   * 421 447 337     INRNo lab results found in last 7 days.  LFTs  Recent Labs   Lab 11/15/21  0712 11/14/21  0822 11/13/21  0657 11/11/21  0026   ALKPHOS 162* 178* 227* 95   AST 18 26 81* 34   ALT 42 60* 98* 64*   BILITOTAL 0.6 0.6 0.8 0.4   PROTTOTAL 5.8* 6.2* 7.0 7.7   ALBUMIN 1.8* 1.8* 2.3* 2.8*      PANC  Recent Labs   Lab 11/13/21  0657 11/11/21  0026   LIPASE 216 232       IMAGING:  Abdomen and pelvis CT with contrast     INDICATION: Abdominal pain, fever, postoperative.     COMPARISON: 11/11/2021     Contrast: 120 mL intravenous [id number]     FINDINGS: New small bilateral pleural effusions are present. There is  associated atelectasis with each effusion.  Increased perihepatic fluid. Extensive edema in the gallbladder fossa  appears similar. Increased right sided abdominal wall fluid. Increased  superficial edema. The air densities at the right anterolateral  abdominal wall appeared to have coalesced into one air density. Other  superficial subcutaneous air (series 5 image 200) is unchanged.  Spleen appears normal. Subcentimeter right renal cyst unchanged. Left  kidney normal. Bilateral adrenals normal. Pancreatic parenchyma  normal.  The area of somewhat linear low density within the liver the  gallbladder fossa is vague, there was motion artifact.  Left ovarian cyst again demonstrated with maximum diameter  approximately 3.9 cm. Pelvic free fluid appears slightly increased.  Urinary bladder appears normal. Uterus appears grossly normal.     Bones appear well mineralized.                                                                      IMPRESSION: Increasing right-sided abdominal wall fluid and increasing  superficial subcutaneous edema. Continued gallbladder fossa  heterogeneous fluid/soft tissue again possibly infected. Continued  appearance of ascites and a left  ovarian cyst and subcentimeter right  renal cyst. Continued air in right abdominal wall and anterior  subcutaneous tissues. Possible previously described liver laceration  not as well-seen on this current examination. The right abdominal wall  fluid and edema could also be infected. Increased ascites.        =======================================================================

## 2021-11-16 NOTE — PROGRESS NOTES
Surgery Progress Note  11/16/2021       Subjective:  No acute events overnight. Pain better controlled, still present in right shoulder. Tolerating regular diet. Passing gas, no BMs.     Objective:  Temp:  [97.6  F (36.4  C)-98.8  F (37.1  C)] 98.8  F (37.1  C)  Pulse:  [93-97] 97  Resp:  [18-20] 18  BP: (123-131)/(82-85) 123/82  SpO2:  [95 %-99 %] 98 %    I/O last 3 completed shifts:  In: 1040 [P.O.:840; I.V.:200]  Out: 465 [Drains:465]      Gen: Awake, alert, NAD  Resp: NLB on RA  Abd: soft, nondistended, nontender, x1 OMAR drain serosanguinous output.   Incision: c/d/I  Ext: WWP, no edema     Labs:  Recent Labs   Lab 11/15/21  0712 11/14/21  0822 11/13/21  0657   WBC 13.8* 19.4* 16.3*   HGB 9.9* 9.7* 11.5*   * 421 447       Recent Labs   Lab 11/15/21  0712 11/14/21  0822 11/13/21  1319 11/13/21  0657    142  --  137   POTASSIUM 3.7 3.7  --  3.8   CHLORIDE 112* 109  --  107   CO2 25 27  --  26   BUN 7 5*  --  6*   CR 0.58 0.55  --  0.54   * 129* 102* 114*   YOVANI 7.7* 8.1*  --  8.2*       Imaging:  CT: 11/13/21 - increasing r. abdl fluid   CT: 11/11/21 - post-surgical hematoma, complex air in gall bladder fossa,      Assessment/Plan:   20 year old female POD# 2 from abdominal washout (11/13/21) s/p bile leak from preivous subtotal johnnie 11/9 with Dr[facility]. Drain removal on 11/10/21 prompted abdominal pain and nausea which brought her to the ED. Imaging at that time showed small fluid collection, possibly hematoma vs expected gallbladder contents s/p subtotal. On admission to [facility] pt had increasing WBC count, altered mental status, and further CT imaging concerning for bile leak. On 11/13/21 pt was taken back to OR for washout and drain placement. Doing well postop, but drain output concerning for persistent bile leak.    Neuro/Pain: PO dilaudid 2mg, tylenol, robaxin, lidocaine patch, gabapentin TID   GI:   - Drain output increased today, concern for continued bile leak.   --  Will consult GI for possible stent placement and sphincterotomy  - NPO for poss procedure and start mIVF  - Cont OMAR drain on disposition   ID: Zosyn 4 days post-op (until 11/17)   Activity: Encourage ambulation  PPx: Lovenox  40mg q24h    Seen, examined, and discussed with chief resident, who will discuss with staff.    Gurvnider Heller MD  Surgery Resident

## 2021-11-17 ENCOUNTER — NURSE TRIAGE (OUTPATIENT)
Dept: NURSING | Facility: CLINIC | Age: 21
End: 2021-11-17

## 2021-11-17 VITALS
OXYGEN SATURATION: 99 % | HEIGHT: 67 IN | RESPIRATION RATE: 18 BRPM | HEART RATE: 86 BPM | WEIGHT: 197 LBS | SYSTOLIC BLOOD PRESSURE: 125 MMHG | BODY MASS INDEX: 30.92 KG/M2 | TEMPERATURE: 97.5 F | DIASTOLIC BLOOD PRESSURE: 89 MMHG

## 2021-11-17 LAB
ERYTHROCYTE [DISTWIDTH] IN BLOOD BY AUTOMATED COUNT: 13.9 % (ref 10–15)
HCT VFR BLD AUTO: 30 % (ref 35–47)
HGB BLD-MCNC: 9.8 G/DL (ref 11.7–15.7)
HOLD SPECIMEN: NORMAL
MCH RBC QN AUTO: 23.3 PG (ref 26.5–33)
MCHC RBC AUTO-ENTMCNC: 32.7 G/DL (ref 31.5–36.5)
MCV RBC AUTO: 71 FL (ref 78–100)
PLATELET # BLD AUTO: 555 10E3/UL (ref 150–450)
RBC # BLD AUTO: 4.2 10E6/UL (ref 3.8–5.2)
WBC # BLD AUTO: 10.6 10E3/UL (ref 4–11)

## 2021-11-17 PROCEDURE — 250N000013 HC RX MED GY IP 250 OP 250 PS 637

## 2021-11-17 PROCEDURE — 85027 COMPLETE CBC AUTOMATED: CPT | Performed by: PHYSICIAN ASSISTANT

## 2021-11-17 PROCEDURE — 36415 COLL VENOUS BLD VENIPUNCTURE: CPT | Performed by: PHYSICIAN ASSISTANT

## 2021-11-17 PROCEDURE — 99233 SBSQ HOSP IP/OBS HIGH 50: CPT | Performed by: PHYSICIAN ASSISTANT

## 2021-11-17 PROCEDURE — 250N000011 HC RX IP 250 OP 636: Performed by: STUDENT IN AN ORGANIZED HEALTH CARE EDUCATION/TRAINING PROGRAM

## 2021-11-17 RX ORDER — ACETAMINOPHEN 325 MG/1
975 TABLET ORAL 4 TIMES DAILY PRN
Qty: 360 TABLET | Refills: 0 | Status: SHIPPED | OUTPATIENT
Start: 2021-11-17 | End: 2021-12-17

## 2021-11-17 RX ADMIN — PIPERACILLIN AND TAZOBACTAM 3.38 G: 3; .375 INJECTION, POWDER, LYOPHILIZED, FOR SOLUTION INTRAVENOUS at 07:36

## 2021-11-17 RX ADMIN — ACETAMINOPHEN 975 MG: 325 TABLET, FILM COATED ORAL at 07:36

## 2021-11-17 RX ADMIN — PIPERACILLIN AND TAZOBACTAM 3.38 G: 3; .375 INJECTION, POWDER, LYOPHILIZED, FOR SOLUTION INTRAVENOUS at 01:27

## 2021-11-17 ASSESSMENT — ACTIVITIES OF DAILY LIVING (ADL)
ADLS_ACUITY_SCORE: 3

## 2021-11-17 NOTE — BRIEF OP NOTE
[facility]    Brief Operative Note    Pre-operative diagnosis: Bile leak [K83.9]  Post-operative diagnosis Same    Procedure: Procedure(s):  ENDOSCOPIC RETROGRADE CHOLANGIOPANCREATOGRAPHY, WITH BILIARY AND PANCREATIC STENT INSERTION. BILIARY SPHINCTEROTOMY.  Surgeon: Surgeon(s) and Role:     * Pj Corrales MD - Primary  Anesthesia: General   Estimated Blood Loss: 5 mL from 11/16/2021 11:45 AM to 11/16/2021  1:17 PM  Drains: None  Specimens: * No specimens in log *  Findings:   Bile duct and pancreatic duct accessed. Pancreatic duct stent placed (4x11) and biliary contrast showed normal biliary tree, cystic duct leading to partial gallbladder remnant. Despite filling w maximal pressure, only filled to neck of gallbladder remnant, but certain that there is a leak at end of remnant. Very likely leak from terminus of partially resected gallbladder, with single clip several CM from end of filled gallbladder remnant.  Biliary sphincterotomy and 10x5CM biliary stent placed.    REC:  Clears  EGD in 3 weeks to remove biliary stents, after same day xray to confirm biliary stent still present.  If biliary drainage not stopped, MRCP to look for other issue (IE disconnected bile duct)    Complications: None.  Implants:   Implant Name Type Inv. Item Serial No.  Lot No. LRB No. Used Action   STENT CORRALES PANCREA FLEX [id number] W/O FLANGE SGL PIGTAIL - [id number] Stent STENT CORRALES PANCREA FLEX [id number] W/O FLANGE SGL PIGTAIL  [employer] [id number] N/A 1 Implanted   STENT COTTON-FAYE ([place]) NICOLASA SOF-FLEX [id number] - [id number] Stent STENT COTTON-MCKINNEY ([place]) NICOLASA SOF-FLEX [id number]  [facility] [id number] N/A 1 Implanted

## 2021-11-17 NOTE — PLAN OF CARE
ERCP to day returned approximately 1435. Sleeping at intervals. Alert and oriented when awake. VSS. OMAR with serous/alfonzo fluid. Abdomen tender but patient denies pain. Clear liquid diet ordered and patient informed.  brought a strawberry smoothie and patient drank half of it before realizing she could not have it. It was milk free. Pt very upset and did not want anything on clear liquid diet list. MIV fluid running at 75/hour. Had large liquid stool this AM. Voiding in adequate amounts.

## 2021-11-17 NOTE — TELEPHONE ENCOUNTER
Patient calling in today stating she was discharged today from [facility] with a OMAR drain and she stripped the OMAR x4 and not having any drainage from the drain. She has some questions about the drain and output.   NO fever   Right leg is tingling. NO swelling or pain in the right leg. NO redness of the the leg. Leg not warm or tender to the touch.   Eating foods, no nausea no vomiting.   Advised to call PCP about leg tingling and monitor for warmth, redness, pain, swelling or red streaking. Advised to report any of these findings immediately. Advised to call PCP about the tingling in the right leg. Patient reported her PCP was at [facility].   She does not want to follow up with [facility] surgery.   Appointment with surgeon is not scheduled.   Dr Pj Corrales did surgery at the [facility]  From [facility].   Appointments on [place] and/or [facility] (with [facility] or [facility] provider or service). Call [phone] if you  haven't heard regarding these appointments within 7 days of discharge.          Reason for Disposition    Tingling (e.g., pins and needles) of the face, arm or leg on one side of the body, that is  present now (Exceptions: chronic/recurrent symptom lasting > 4 weeks or tingling from known cause, such as: bumped elbow, carpal tunnel syndrome, pinched nerve, frostbite)    Additional Information    Negative: Bowie palsy suspected (i.e., weakness only one side of the face, developing over hours to days, no other symptoms)    Negative: Neurologic deficit of gradual onset, ANY of the following: * Weakness of the face, arm, or leg on one side of the body * Numbness of the face, arm, or leg on one side of the body * Loss of speech or garbled speech    Negative: Neurologic deficit that was brief (now gone), ANY of the following: * Weakness of the face, arm, or leg on one side of the body * Numbness of the face, arm, or leg on one side of the body * Loss of speech or garbled speech    Negative: Patient  sounds very sick or weak to the triager    Negative: Headache (with neurologic deficit)    Negative: Unable to urinate (or only a few drops) and bladder feels very full    Negative: Loss of control of bowel or bladder (i.e., incontinence) of new onset    Negative: Back pain with numbness (loss of sensation) in groin or rectal area    Negative: Confusion, disorientation, or hallucinations is the main symptom    Negative: Dizziness is the main symptom    Negative: Followed a head injury within last 3 days    Negative: Difficult to awaken or acting confused (e.g., disoriented, slurred speech)    Negative: New neurologic deficit that is present NOW, sudden onset of ANY of the following: * Weakness of the face, arm, or leg on one side of the body* Numbness of the face, arm, or leg on one side of the body* Loss of speech or garbled speech    Negative: Sounds like a life-threatening emergency to the triager    Protocols used: NEUROLOGIC DEFICIT-A-OH

## 2021-11-17 NOTE — PROGRESS NOTES
Patient discharged at 1100, teaching about [place] drain, and medication completed and AVS signs. Would  medication at discharge pharmacy.

## 2021-11-17 NOTE — PROGRESS NOTES
"    GASTROENTEROLOGY PROGRESS NOTE    Date of Admission: 11/10/2021  Reason for Admission: abdominal pain, bile leak      Assessment:  20 year old female who underwent subtotal cholecystectomy for acute cholecystitis at OSH on 11/9 ([facility]), developed post operative pain and nausea prompting return to ED on 11/10. Found to have bile leak based on imaging (CT scan with fluid collection) s/p RTOR and lap washout.     #. Post (subtotal) cholecystectomy bile leak   #. Bile peritonitis s/p lap washout and OMAR drain 11/13  #. Leukocytosis  CT on admission showing post-surgical hematoma vs complex collection in GB fossa. Decompensated clinically on 11/13 with AMS and worsening leukocytosis, brought back to OR for lap washout -- 600ml of bile removed from the abdomen with associated peritonitis. OMAR drain placed but with ongoing bilious output concerning for ongoing leak.    S/p ERCP with no e/o obvious leak however suspicion was leak from GB remnant. 10x5cm biliary stent placed. Doing great this morning, planning to discharge today.    RECOMMENDATIONS:  ADAT  Drain management per primary team  Repeat AXR in 3 weeks, EGD if stent still in place  Analgesia and antibiotics per primary surgery team    The patient was discussed and plan agreed upon with GI staff, Dr Rai.      Vanessa Lozano PA-C  Advanced Endoscopy/Pancreaticobiliary GI Service  [facility]  Text Page  _______________________________________________________________      Subjective: Nursing notes and 24hr events reviewed. Patient seen and examined at 0945. Patient reports that she is not having any abdominal pain today. Tolerating regular diet, plan for discharge today.    ROS:   4 pt ROS negative unless noted in subjective.     Objective:  Blood pressure 125/89, pulse 86, temperature 97.5  F (36.4  C), temperature source Oral, resp. rate 18, height 1.702 m (5' 7\"), weight 89.4 kg (197 lb), SpO2 99 %.  Gen: Sitting in " bed. Appears comfortable  HEENT: NCAT. Conjunctiva clear. Sclera anicteric  Chest: non labored breathing  Abd: OMAR with s/s output  Msk: no gross deformity  Skin:  no jaundice  Ext: warm, well perfused  Neuro: grossly normal  Mental status/Psych: A&O. Asks/answers questions appropriately       PROCEDURES:  ERCP 11/16  Bile duct and pancreatic duct accessed. Pancreatic duct stent placed (4x11) and biliary contrast showed normal biliary tree, cystic duct leading to partial gallbladder remnant. Despite filling w maximal pressure, only filled to neck of gallbladder remnant, but certain that there is a leak at end of remnant. Very likely leak from terminus of partially resected gallbladder, with single clip several CM from end of filled gallbladder remnant.  Biliary sphincterotomy and 10x5CM biliary stent placed.    LABS:  BMP  Recent Labs   Lab 11/15/21  0712 11/14/21  0822 11/13/21  1319 11/13/21  0657 11/11/21  0026    142  --  137 138   POTASSIUM 3.7 3.7  --  3.8 4.0   CHLORIDE 112* 109  --  107 105   YOVANI 7.7* 8.1*  --  8.2* 8.8   CO2 25 27  --  26 28   BUN 7 5*  --  6* 7   CR 0.58 0.55  --  0.54 0.74   * 129* 102* 114* 104*     CBC  Recent Labs   Lab 11/15/21  0712 11/14/21  0822 11/13/21  0657 11/12/21  0725   WBC 13.8* 19.4* 16.3* 10.9   RBC 4.21 4.09 4.88 4.50   HGB 9.9* 9.7* 11.5* 10.6*   HCT 30.3* 29.3* 35.6 32.8*   MCV 72* 72* 73* 73*   MCH 23.5* 23.7* 23.6* 23.6*   MCHC 32.7 33.1 32.3 32.3   RDW 13.9 13.6 13.6 13.7   * 421 447 337     INRNo lab results found in last 7 days.  LFTs  Recent Labs   Lab 11/15/21  0712 11/14/21  0822 11/13/21  0657 11/11/21  0026   ALKPHOS 162* 178* 227* 95   AST 18 26 81* 34   ALT 42 60* 98* 64*   BILITOTAL 0.6 0.6 0.8 0.4   PROTTOTAL 5.8* 6.2* 7.0 7.7   ALBUMIN 1.8* 1.8* 2.3* 2.8*      PANC  Recent Labs   Lab 11/13/21  0657 11/11/21  0026   LIPASE 216 232         IMAGING:  reviewed

## 2021-11-17 NOTE — PLAN OF CARE
Vital signs:  Temp: 98  F (36.7  C) Temp src: Oral BP: 110/68 Pulse: 89   Resp: 20 SpO2: 95 % O2 Device: None (Room air)      Activity: Up independently.   Neuros: A&O x4.   Cardiac: WDL.   Respiratory: WDL on RA. Denies SOB.  GI/: Voiding spontaneously. +BS, passing flatus.   Diet: clear liquid   Lines: PIV TKO. IV ABX given per orders.   Incisions/Drains: Lap sites with primapore, CDI. Right OMAR with small output.  Pain/nausea: Denies, declined pain meds.

## 2021-11-18 ENCOUNTER — PATIENT OUTREACH (OUTPATIENT)
Dept: CARE COORDINATION | Facility: CLINIC | Age: 21
End: 2021-11-18
Payer: COMMERCIAL

## 2021-11-18 DIAGNOSIS — Z71.89 OTHER SPECIFIED COUNSELING: ICD-10-CM

## 2021-11-18 LAB
BACTERIA BLD CULT: NO GROWTH
BACTERIA BLD CULT: NO GROWTH
BACTERIA FLD CULT: NO GROWTH

## 2021-11-18 NOTE — PROGRESS NOTES
[facility]: Post-Discharge Note  SITUATION                                                      Admission:    Admission Date: 11/10/21   Reason for Admission: Postoperative abdominal pain  Discharge:   Discharge Date: 11/17/21  Discharge Diagnosis: Postoperative abdominal pain    BACKGROUND                                                    The patient was admitted for pain control and observation. Her pain initially improved but was substantially worsened on 11/13. In the setting of worsening abdominal pain with a fever, CT was obtained and showed increased fluid near gallbladder fossa and in pelvis. At that time it was unclear what was causing this as her LFT values were not supportive of a bile leak, her hemoglobin was not decreasing and it would've been too early in her post-operative course for abscess formation. However, given her pain and imaging findings, she was taken back to OR for washout and drain placement, upon which copious bile was found within the abdomen. Post-operatively, her pain improved greatly and her diet was advanced as tolerated. Her drain output decreased initially, however increased on 11/16 prompting a GI consult for evaluation and ERCP and stenting which was performed on 11/16. Post-procedure, she recovered very well, diet was advanced with no issue. On day of discharge, her pain was well controlled, voiding independently, ambulating independently, tolerating a regular diet.     ASSESSMENT      Enrollment  Primary Care Care Coordination Status: Not a Candidate    Discharge Assessment  How are you doing now that you are home?: Patient said that she is no longer in pain and is doing well.  How are your symptoms? (Red Flag symptoms escalate to triage hotline per guidelines): Improved  Do you feel your condition is stable enough to be safe at home until your provider visit?: Yes  Does the patient have their discharge instructions? : Yes  Does the patient have questions regarding  their discharge instructions? : No  Were you started on any new medications or were there changes to any of your previous medications? : Yes  Does the patient have all of their medications?: Yes  Do you have questions regarding any of your medications? : No  Do you have all of your needed medical supplies or equipment (DME)?  (i.e. oxygen tank, CPAP, cane, etc.): Yes  Discharge follow-up appointment scheduled within 14 calendar days? : Yes  Discharge Follow Up Appointment Date: 11/22/21 (Non [facility] provider)  Discharge Follow Up Appointment Scheduled with?: Specialty Care Provider    Post-op (W CTA Only)  If the patient had a surgery or procedure, do they have any questions for a nurse?: No             PLAN                                                      Outpatient Plan:  Follow up with primary care provider, within 7 days to evaluate after surgery.  No follow up labs or test are needed.        Follow up with operating surgeon at [facility] in approximately 1 week.      Follow up with [facility] in 3 weeks.      Appointments on [place] and/or [facility] (with [facility] or [facility] provider or service). Call [phone] if you haven't heard regarding these appointments within 7 days of discharge.    No future appointments.      For any urgent concerns, please contact our 24 hour nurse triage line: [phone] ([phone])         AURE Carbajal  [phone]  [facility]  [facility]

## 2021-11-20 LAB — BACTERIA FLD CULT: NORMAL

## 2021-11-22 ENCOUNTER — PATIENT OUTREACH (OUTPATIENT)
Dept: GASTROENTEROLOGY | Facility: CLINIC | Age: 21
End: 2021-11-22
Payer: COMMERCIAL

## 2021-11-22 ENCOUNTER — PATIENT OUTREACH (OUTPATIENT)
Dept: SURGERY | Facility: CLINIC | Age: 21
End: 2021-11-22
Payer: COMMERCIAL

## 2021-11-22 DIAGNOSIS — Z46.89 ENCOUNTER FOR REMOVAL OF BILIARY STENT: Primary | ICD-10-CM

## 2021-11-22 NOTE — PROGRESS NOTES
Attempted to reach patient regarding OMAR drain questions and removal.  No answer.  LM on VM to call office.

## 2021-11-22 NOTE — TELEPHONE ENCOUNTER
Per ERCP note from 11/16/21    Monitor stent position by performing a KUB x-ray in   3 weeks.     Orders placed, left message for patient.    ML

## 2021-11-23 NOTE — PROGRESS NOTES
Spoke with the patient and she is returning to see her original operating surgeon at [facility].  She declined an appointment with the [facility] General Surgery Team.

## 2021-11-24 ENCOUNTER — TELEPHONE (OUTPATIENT)
Dept: GASTROENTEROLOGY | Facility: CLINIC | Age: 21
End: 2021-11-24
Payer: COMMERCIAL

## 2021-11-24 NOTE — TELEPHONE ENCOUNTER
"[facility] Call Center    Phone Message    May a detailed message be left on voicemail: yes     Reason for Call: Other: Discharge order recieved for Pt for \"follow up with [facility] in 3 weeks\" nothing available in that time frame, please reach out to Pt to discuss scheduling options, thanks     Action Taken: Other: GI    Travel Screening: Not Applicable                                                                      "

## 2021-11-26 NOTE — TELEPHONE ENCOUNTER
Spoke with Malika and a AYALA x-ray needs to be scheduled. Provided the number to schedule.   Malika will call to schedule

## 2021-12-06 NOTE — TELEPHONE ENCOUNTER
Health Call Center    Phone Message    May a detailed message be left on voicemail: yes     Reason for Call: Other: Pt will be seen at [facility], they are creating a referral but want to know that if the tech says the stent has passed is there anything further that the pt needs to do. Please reach out. Thank you.     Action Taken: Message routed to:  [facility] ([facility]):  gi    Travel Screening: Not Applicable

## 2021-12-07 NOTE — TELEPHONE ENCOUNTER
Provider called back, pt will get KUB done one Thurs.     Minimal drainage from tube site, will likely get drain pulled this week following recent HIDA scan.    ML

## 2021-12-07 NOTE — TELEPHONE ENCOUNTER
Returned call to provider, KUB has not been completed. HIDA scan done but did not assess for pancreatic stent.    ML

## 2021-12-13 ENCOUNTER — DOCUMENTATION ONLY (OUTPATIENT)
Dept: GASTROENTEROLOGY | Facility: CLINIC | Age: 21
End: 2021-12-13
Payer: COMMERCIAL

## 2021-12-13 NOTE — PROGRESS NOTES
Called to have xray images pushed:    DOS: 12/8/21    @ [facility]    [address], [place] [id number] [phone]      Images will be pushed to  PACS.    Mikie Perkins LPN

## 2021-12-17 ENCOUNTER — TELEPHONE (OUTPATIENT)
Dept: GASTROENTEROLOGY | Facility: CLINIC | Age: 21
End: 2021-12-17
Payer: COMMERCIAL

## 2021-12-17 ENCOUNTER — HOSPITAL ENCOUNTER (OUTPATIENT)
Facility: CLINIC | Age: 21
End: 2021-12-17
Attending: INTERNAL MEDICINE | Admitting: INTERNAL MEDICINE
Payer: COMMERCIAL

## 2021-12-17 DIAGNOSIS — Z11.59 ENCOUNTER FOR SCREENING FOR OTHER VIRAL DISEASES: ICD-10-CM

## 2021-12-17 DIAGNOSIS — Z46.89 ENCOUNTER FOR REMOVAL OF BILIARY STENT: Primary | ICD-10-CM

## 2021-12-17 NOTE — TELEPHONE ENCOUNTER
Screening Questions  1. Are you active on mychart? NO    2. What insurance is in the chart? BLUE PLUS     2.  Ordering/Referring Provider: Pj Corrales MD    3. BMI 29.8, If greater than 40 review exclusion criteria    4.  Respiratory Screening (If yes to any of the following Hospital setting only):     Do you use daily home oxygen? NO  Do you have mod to severe Obstructive Sleep Apnea? NO   Do you have Pulmonary Hypertension? NO   Do you have UNCONTROLLED asthma? NO    5. Have you had a heart or lung transplant (If yes, please review exclusion criteria) ? NO    6. Are you currently on dialysis or have chronic kidney disease? NO    7. Have you had a stroke or Transient ischemic attack (TIA) within 6 months? NO    8. In the past 6 months, have you had any heart related issues including cardiomyopathy or heart attack? NO                 If yes, did it require cardiac stenting or other implantable device?NO      9. Do you have any implantable devices in your body (pacemaker, defib, LVAD)? NO    10. Do you take nitroglycerin? If yes, how often? NO    11. Are you currently taking any blood thinners?NO    12. Are you a diabetic? NO    13. (Females) Are you currently pregnant? NO  If yes, how many weeks?      15. Are you taking any prescription pain medications on a routine schedule? NO If yes, MAC sedation.    16. Do you have any chemical dependencies such as alcohol, street drugs, or methadone? NOIf yes, MAC sedation.    17. Do you have any history of post-traumatic stress syndrome, severe anxiety or history of psychosis? NO    18. Do you transfer independently? YES    19.  Do you have any issues with constipation? M/A    20. Preferred Pharmacy for Pre Prescription N/A    Scheduling Details    Which Colonoscopy Prep was Sent?:   Procedure Scheduled: EGD (STENT REMOVAL)   Surgeon: RACHEL  Date of Procedure: 12/20  Location: [facility]   Caller (Please ask for phone number if not scheduled by patient):  [email or web address]      Sedation Type: CS  Conscious Sedation- Needs  for 6 hours after the procedure  MAC/General-Needs  for 24 hours after procedure    Pre-op Required at [facility] [place] [place] and OR for MAC sedation:   (if yes advise patient they will need a pre-op prior to procedure)      Is patient on blood thinners? -no (If yes- inform patient to follow up with PCP or provider for follow up instructions)     Informed patient they will need an adult  yes  Cannot take any type of public or medical transportation alone    Pre-Procedure Covid test to be completed at [facility] or Externally:  [facility]     Confirmed Nurse will call to complete assessment YES    Additional comments:

## 2021-12-17 NOTE — TELEPHONE ENCOUNTER
Pre assessment questions completed for upcoming EGD procedure scheduled on 12.20.2021    COVID test:  Pt stated that she plans on doing it at her local hospital.  Pt is aware she needs a PCR COVID test.  [facility] does not accept a rapid test, spit test, or home kit.    Reviewed procedural arrival time 0800 and facility location [facility].    Designated  policy reviewed. Instructed to have someone stay 6 hours post procedure.     Anticoagulation/blood thinners? no    Electronic implanted devices? no    Reviewed EGD prep instructions with patient.     Pt requests EGD prep instructions communication via unencrypted e-mail.   Pt acknowledges that they have agreed to accept the risks and receive unencrypted communications for this incidence.  [email or web address]    Patient verbalized understanding and had no questions or concerns at this time.    Tahmina Elmore RN

## 2021-12-17 NOTE — TELEPHONE ENCOUNTER
Pt called back to discuss xray results/stent follow up. Discuss stent is still present, so she will need a procedure to pull the stent -EGD. Discussed order has been placed and the Endo scheduling will contact her to schedule. Per Pt, coming up Monday will work. Message sent to Endo scheduling to contact Pt.    Mikie Perkins LPN

## 2021-12-17 NOTE — TELEPHONE ENCOUNTER
QUESTION SENT OVER, DR CRAWFORD'S NEXT AVAILABLE IS JAN 5TH 2022, IS THAT DATE SOON ENOUGH. PATIENT MENTIONED THAT IT SHOULD BE DONE BY Monday SOMETIME NEXT WEEK

## 2021-12-20 RX ORDER — LIDOCAINE 40 MG/G
CREAM TOPICAL
Status: CANCELLED | OUTPATIENT
Start: 2021-12-20

## 2021-12-20 RX ORDER — ONDANSETRON 2 MG/ML
4 INJECTION INTRAMUSCULAR; INTRAVENOUS
Status: CANCELLED | OUTPATIENT
Start: 2021-12-20

## 2021-12-21 ENCOUNTER — TELEPHONE (OUTPATIENT)
Dept: GASTROENTEROLOGY | Facility: CLINIC | Age: 21
End: 2021-12-21
Payer: COMMERCIAL

## 2021-12-21 NOTE — TELEPHONE ENCOUNTER
"From Endo scheduling,  \"I'm not sure what happened with this all. But she didn't show for her procedure today. Please call and get rescheduled\"    Attempted to contact Pt to get her rescheduled. No answer. Message on Pt's phone stated \"call cannot be completed at this time, please try again later\". Will try again later.    Mikie Perkins, CHANO       "

## 2021-12-22 ENCOUNTER — TELEPHONE (OUTPATIENT)
Dept: GASTROENTEROLOGY | Facility: CLINIC | Age: 21
End: 2021-12-22
Payer: COMMERCIAL

## 2021-12-22 NOTE — TELEPHONE ENCOUNTER
"Attempted to contact Pt again to get her rescheduled. No answer. Message on Pt's phone still stated \"call cannot be completed at this time, please try again later\".     Called Pt at her Mother's mobile. No answer. Left brief message to have Pt call me back.    Mikie Perkins LPN  "

## 2021-12-23 ENCOUNTER — TELEPHONE (OUTPATIENT)
Dept: GASTROENTEROLOGY | Facility: CLINIC | Age: 21
End: 2021-12-23
Payer: COMMERCIAL

## 2021-12-23 NOTE — TELEPHONE ENCOUNTER
"Attempted to contact Pt again to help get her rescheduled for stent removal. No answer, message on Pt's mobile phone still says \"call cannot be completed, please try again later\".    Called Pt's mother's phone again. No answer. Left VM for Mother to ask Pt to return my call.    Mikie Perkins LPN    "

## 2021-12-24 ENCOUNTER — CARE COORDINATION (OUTPATIENT)
Dept: GASTROENTEROLOGY | Facility: CLINIC | Age: 21
End: 2021-12-24
Payer: COMMERCIAL

## 2024-10-17 ENCOUNTER — APPOINTMENT (OUTPATIENT)
Dept: CT IMAGING | Facility: CLINIC | Age: 24
End: 2024-10-17
Attending: EMERGENCY MEDICINE

## 2024-10-17 ENCOUNTER — HOSPITAL ENCOUNTER (EMERGENCY)
Facility: CLINIC | Age: 24
Discharge: HOME | End: 2024-10-17
Attending: EMERGENCY MEDICINE | Admitting: EMERGENCY MEDICINE

## 2024-10-17 VITALS
SYSTOLIC BLOOD PRESSURE: 125 MMHG | HEIGHT: 67 IN | HEART RATE: 83 BPM | OXYGEN SATURATION: 99 % | BODY MASS INDEX: 30.61 KG/M2 | RESPIRATION RATE: 16 BRPM | DIASTOLIC BLOOD PRESSURE: 87 MMHG | WEIGHT: 195 LBS | TEMPERATURE: 99 F

## 2024-10-17 DIAGNOSIS — K02.9 DENTAL CARIES: ICD-10-CM

## 2024-10-17 DIAGNOSIS — L03.211 FACIAL CELLULITIS: ICD-10-CM

## 2024-10-17 DIAGNOSIS — K04.7 PERIAPICAL ABSCESS: ICD-10-CM

## 2024-10-17 LAB
ANION GAP SERPL CALCULATED.3IONS-SCNC: 12 MMOL/L (ref 7–15)
BASOPHILS # BLD AUTO: 0 10E3/UL (ref 0–0.2)
BASOPHILS NFR BLD AUTO: 0 %
BUN SERPL-MCNC: 11.6 MG/DL (ref 6–20)
CALCIUM SERPL-MCNC: 9 MG/DL (ref 8.8–10.4)
CHLORIDE SERPL-SCNC: 109 MMOL/L (ref 98–107)
CREAT SERPL-MCNC: 1.07 MG/DL (ref 0.51–0.95)
EGFRCR SERPLBLD CKD-EPI 2021: 74 ML/MIN/1.73M2
EOSINOPHIL # BLD AUTO: 0.1 10E3/UL (ref 0–0.7)
EOSINOPHIL NFR BLD AUTO: 1 %
ERYTHROCYTE [DISTWIDTH] IN BLOOD BY AUTOMATED COUNT: 14.1 % (ref 10–15)
GLUCOSE SERPL-MCNC: 85 MG/DL (ref 70–99)
HCO3 SERPL-SCNC: 22 MMOL/L (ref 22–29)
HCT VFR BLD AUTO: 38.9 % (ref 35–47)
HGB BLD-MCNC: 12.8 G/DL (ref 11.7–15.7)
IMM GRANULOCYTES # BLD: 0 10E3/UL
IMM GRANULOCYTES NFR BLD: 0 %
LYMPHOCYTES # BLD AUTO: 0.9 10E3/UL (ref 0.8–5.3)
LYMPHOCYTES NFR BLD AUTO: 7 %
MCH RBC QN AUTO: 24.6 PG (ref 26.5–33)
MCHC RBC AUTO-ENTMCNC: 32.9 G/DL (ref 31.5–36.5)
MCV RBC AUTO: 75 FL (ref 78–100)
MONOCYTES # BLD AUTO: 0.8 10E3/UL (ref 0–1.3)
MONOCYTES NFR BLD AUTO: 6 %
NEUTROPHILS # BLD AUTO: 11.4 10E3/UL (ref 1.6–8.3)
NEUTROPHILS NFR BLD AUTO: 85 %
NRBC # BLD AUTO: 0 10E3/UL
NRBC BLD AUTO-RTO: 0 /100
PLATELET # BLD AUTO: 230 10E3/UL (ref 150–450)
POTASSIUM SERPL-SCNC: 3.7 MMOL/L (ref 3.4–5.3)
RBC # BLD AUTO: 5.21 10E6/UL (ref 3.8–5.2)
SODIUM SERPL-SCNC: 143 MMOL/L (ref 135–145)
WBC # BLD AUTO: 13.3 10E3/UL (ref 4–11)

## 2024-10-17 PROCEDURE — 99285 EMERGENCY DEPT VISIT HI MDM: CPT | Mod: 25

## 2024-10-17 PROCEDURE — 250N000013 HC RX MED GY IP 250 OP 250 PS 637: Performed by: EMERGENCY MEDICINE

## 2024-10-17 PROCEDURE — 250N000011 HC RX IP 250 OP 636: Performed by: EMERGENCY MEDICINE

## 2024-10-17 PROCEDURE — 96375 TX/PRO/DX INJ NEW DRUG ADDON: CPT

## 2024-10-17 PROCEDURE — 85025 COMPLETE CBC W/AUTO DIFF WBC: CPT | Performed by: EMERGENCY MEDICINE

## 2024-10-17 PROCEDURE — 80048 BASIC METABOLIC PNL TOTAL CA: CPT | Performed by: EMERGENCY MEDICINE

## 2024-10-17 PROCEDURE — 96365 THER/PROPH/DIAG IV INF INIT: CPT

## 2024-10-17 PROCEDURE — 36415 COLL VENOUS BLD VENIPUNCTURE: CPT | Performed by: EMERGENCY MEDICINE

## 2024-10-17 PROCEDURE — 70490 CT SOFT TISSUE NECK W/O DYE: CPT

## 2024-10-17 RX ORDER — OXYCODONE HYDROCHLORIDE 5 MG/1
10 TABLET ORAL ONCE
Status: DISCONTINUED | OUTPATIENT
Start: 2024-10-17 | End: 2024-10-17

## 2024-10-17 RX ORDER — AMPICILLIN AND SULBACTAM 2; 1 G/1; G/1
3 INJECTION, POWDER, FOR SOLUTION INTRAMUSCULAR; INTRAVENOUS ONCE
Status: COMPLETED | OUTPATIENT
Start: 2024-10-17 | End: 2024-10-17

## 2024-10-17 RX ORDER — OXYCODONE HYDROCHLORIDE 5 MG/1
10 TABLET ORAL ONCE
Status: COMPLETED | OUTPATIENT
Start: 2024-10-17 | End: 2024-10-17

## 2024-10-17 RX ORDER — DEXAMETHASONE SODIUM PHOSPHATE 10 MG/ML
10 INJECTION, SOLUTION INTRAMUSCULAR; INTRAVENOUS ONCE
Status: COMPLETED | OUTPATIENT
Start: 2024-10-17 | End: 2024-10-17

## 2024-10-17 RX ORDER — TRAMADOL HYDROCHLORIDE 50 MG/1
50 TABLET ORAL EVERY 6 HOURS PRN
Qty: 10 TABLET | Refills: 0 | Status: SHIPPED | OUTPATIENT
Start: 2024-10-17 | End: 2024-10-20

## 2024-10-17 RX ORDER — ONDANSETRON 4 MG/1
4 TABLET, ORALLY DISINTEGRATING ORAL ONCE
Status: COMPLETED | OUTPATIENT
Start: 2024-10-17 | End: 2024-10-17

## 2024-10-17 RX ADMIN — AMPICILLIN SODIUM AND SULBACTAM SODIUM 3 G: 2; 1 INJECTION, POWDER, FOR SOLUTION INTRAMUSCULAR; INTRAVENOUS at 12:02

## 2024-10-17 RX ADMIN — OXYCODONE HYDROCHLORIDE 10 MG: 5 TABLET ORAL at 09:47

## 2024-10-17 RX ADMIN — DEXAMETHASONE SODIUM PHOSPHATE 10 MG: 10 INJECTION, SOLUTION INTRAMUSCULAR; INTRAVENOUS at 12:03

## 2024-10-17 RX ADMIN — ONDANSETRON 4 MG: 4 TABLET, ORALLY DISINTEGRATING ORAL at 09:44

## 2024-10-17 ASSESSMENT — COLUMBIA-SUICIDE SEVERITY RATING SCALE - C-SSRS
2. HAVE YOU ACTUALLY HAD ANY THOUGHTS OF KILLING YOURSELF IN THE PAST MONTH?: NO
6. HAVE YOU EVER DONE ANYTHING, STARTED TO DO ANYTHING, OR PREPARED TO DO ANYTHING TO END YOUR LIFE?: NO
1. IN THE PAST MONTH, HAVE YOU WISHED YOU WERE DEAD OR WISHED YOU COULD GO TO SLEEP AND NOT WAKE UP?: NO

## 2024-10-17 ASSESSMENT — ACTIVITIES OF DAILY LIVING (ADL)
ADLS_ACUITY_SCORE: 35

## 2024-10-17 NOTE — ED TRIAGE NOTES
Pt has had dental pain and not able to get seen at a dentist   Pt is nauseated and is vomiting

## 2024-10-17 NOTE — ED PROVIDER NOTES
"  Emergency Department Note      History of Present Illness     Chief Complaint   Vomiting and Dental Pain      HPI   Malika Stokes is a 23 year old female who presents for dental pain for the past 1 week and vomiting since last night. Left lower jaw pain is sharp and throbbing and has worsened in past 2 days. Pain radiates to ear and is 7-8/10 in severity. Reports difficulty opening jaw and eating. She states she has had a difficult time seeing a dentist because she does not have insurance. Patient has had 2 vomiting episodes, fever, and chills since last night. Reports vomit this morning had a \"good amount\" of blood in it. Also reports sore throat. When asked about double or blurry vision, patient states she has had to \"focus\" her eyes lately. She reports she has never experienced this before. Patient has been taking dual action ibuprofen and tylenol for symptoms but reports it is too much for her body to handle.     Independent Historian   None    Review of External Notes    none pertinent     Past Medical History     Medical History and Problem List   Hx ovarian cyst     Medications   The patient is not currently taking any prescribed medications.    Surgical History   Endoscopic retrograde cholangiopancreatography  Insert drain tube abdomen  Irrigation and debridement abdomen washout, combined  Cholecystectomy     Physical Exam     Patient Vitals for the past 24 hrs:   BP Temp Temp src Pulse Resp SpO2 Height Weight   10/17/24 0900 125/87 99  F (37.2  C) Oral 83 16 99 % 1.702 m (5' 7\") 88.5 kg (195 lb)     Physical Exam  Constitutional: Well developed, nontox appearance  Head: Atraumatic. L TM clear, no mastoid tenderness  Mouth/Throat: Oropharynx is moist, mild trismus, inflammation to the gingival tissue along lower left molars, no obvious abscess, no sublingual swelling, mild left submandibular tenderness   Neck:  no stridor, no meningismus  Eyes: no scleral icterus, EOMI, PERRL  Cardiovascular: RRR, 2+ " bilat radial pulses  Pulmonary/Chest: nml resp effort  Ext: Warm, well perfused  Neurological: A&O, symmetric facies, moves ext x4  Skin: Skin is warm and dry.   Psychiatric: Behavior is normal. Thought content normal.   Nursing note and vitals reviewed    Diagnostics     Lab Results   Labs Ordered and Resulted from Time of ED Arrival to Time of ED Departure   BASIC METABOLIC PANEL - Abnormal       Result Value    Sodium 143      Potassium 3.7      Chloride 109 (*)     Carbon Dioxide (CO2) 22      Anion Gap 12      Urea Nitrogen 11.6      Creatinine 1.07 (*)     GFR Estimate 74      Calcium 9.0      Glucose 85     CBC WITH PLATELETS AND DIFFERENTIAL - Abnormal    WBC Count 13.3 (*)     RBC Count 5.21 (*)     Hemoglobin 12.8      Hematocrit 38.9      MCV 75 (*)     MCH 24.6 (*)     MCHC 32.9      RDW 14.1      Platelet Count 230      % Neutrophils 85      % Lymphocytes 7      % Monocytes 6      % Eosinophils 1      % Basophils 0      % Immature Granulocytes 0      NRBCs per 100 WBC 0      Absolute Neutrophils 11.4 (*)     Absolute Lymphocytes 0.9      Absolute Monocytes 0.8      Absolute Eosinophils 0.1      Absolute Basophils 0.0      Absolute Immature Granulocytes 0.0      Absolute NRBCs 0.0         Imaging   CT Soft Tissue Neck w/o Contrast   Final Result   IMPRESSION:     1. Periapical lucency about the left first mandibular molar with   associated minimal fat stranding in the left sublingual and   submandibular spaces, concerning for an infectious/inflammatory   process. No definite abscess, although evaluation is limited without   contrast.   2. Periapical lucency about the right second mandibular molar roots   without associated soft tissue inflammatory changes.   3. Asymmetric mildly prominent left level Ia and Ib lymph nodes,   likely reactive.      CALIN DE ANDA MD            SYSTEM ID:  [id number]        Independent Interpretation   None    ED Course      Medications Administered   Medications   oxyCODONE  (ROXICODONE) tablet 10 mg (10 mg Oral $Given 10/17/24 0938)   ondansetron (ZOFRAN ODT) ODT tab 4 mg (4 mg Oral $Given 10/17/24 0944)   ampicillin-sulbactam (UNASYN) 3 g vial to attach to  mL bag (0 g Intravenous Stopped 10/17/24 1223)   dexAMETHasone PF (DECADRON) injection 10 mg (10 mg Intravenous $Given 10/17/24 1203)     Discussion of Management   None    ED Course   ED Course as of 10/17/24 1230   Thu Oct 17, 2024   0929 I obtained history and examined the patient as noted above.       Additional Documentation  Social Determinants of Health: tobacco use    Medical Decision Making / Diagnosis     CMS Diagnoses: none    MIPS       None    MDM   Malika Stokes is a 23 year old female presenting w/ dental pain     DDx includes periapical abscess, dentalgia NOS, dental caries, dental fracture. TMJ dysfunction/bruxism.  No evidence of dental abscess on exam.  Doubt meningitis, encephalitis, intracranial infection.  Given swelling and tenderness with associated trismus, CT performed without evidence of obvious abscess with demonstration of likely periapical abscesses.  Labs significant for leukocytosis.  IV antibiotics given as noted above as well as dexamethasone.  Prescription provided for Augmentin as noted below.  I do not feel admission or emergent oral surgery or dental consultation is indicated at this time.  Patient was encouraged to follow-up with her dentist for definitive management.  At this time I feel the pt is safe for discharge.  Recommendations given regarding follow up with dentistry for definitive management and return to the emergency department as needed for new or worsening symptoms.  Patient counseled on all results, disposition and diagnosis.  They are understanding and agreeable to plan. Patient discharged in stable condition.    Disposition   The patient was discharged.     Diagnosis     ICD-10-CM    1. Periapical abscess  K04.7       2. Dental caries  K02.9       3. Facial  cellulitis  L03.211            Discharge Medications   Discharge Medication List as of 10/17/2024 12:23 PM        START taking these medications    Details   amoxicillin-clavulanate (AUGMENTIN) 875-125 MG tablet Take 1 tablet by mouth 2 times daily for 20 doses., Disp-20 tablet, R-0, E-Prescribe      traMADol (ULTRAM) 50 MG tablet Take 1 tablet (50 mg) by mouth every 6 hours as needed for severe pain., Disp-10 tablet, R-0, E-Prescribe               Scribe Disclosure:  Priya LANDON, am serving as a scribe at 9:24 AM on 10/17/2024 to document services personally performed by Dorian Munoz MD based on my observations and the provider's statements to me.        Dorian Munoz MD  10/17/24 3761

## 2024-10-17 NOTE — DISCHARGE INSTRUCTIONS
-Take acetaminophen 500 to 1000 mg by mouth every 4 to 6 hours as needed for pain or fever.  Do not take more than 4000 mg in 24 hours.  Do not take within 6 hours of another acetaminophen containing medication such as norco (vicodin) or percocet.  - Take ibuprofen 600 to 800 mg by mouth every 6 to 8 hours as needed for pain or fever      Discharge Instructions  Dental Pain    You have been seen today for a toothache. Your pain may be caused by an exposed nerve, an infection (pulpitis), a root abscess (pocket of pus), or other problems. You will need to see a dentist for a solution to your tooth problem. Emergency Department care is only to help control your problem until you can see a dentist; we cannot provide complete dental care.  Today, we did not find any sign that your toothache was caused by any dangerous or life-threatening condition, but sometimes symptoms develop over time and cannot be found during an emergency visit, so it is very important that you follow up with your dentist.      Generally, every Emergency Department visit should have a follow-up clinic visit with either a primary or a specialty clinic/provider. Please follow-up as instructed by your emergency provider today.    Return to the Emergency Department if:  You develop a new fever over 100.4 F.  You cannot open your mouth normally, cannot move your tongue well, or cannot swallow.  You have new or increased swelling of your face or neck.  You develop drainage of pus or foul smelling material from around your tooth.  What can I do to help myself?  Take any antibiotic the provider may have prescribed for you today.  Avoid very hot or very cold foods as both can cause pain.  Make an appointment to see a dentist as soon as possible. Dentists are generally not  on-staff  at hospitals so we cannot  refer  to you to dentist but we may be able to provide a list of dental clinics to help you.  If you were given a prescription for medicine here  today, be sure to read all of the information (including the package insert) that comes with your prescription.  This will include important information about the medicine, its side effects, and any warnings that you need to know about.  The pharmacist who fills the prescription can provide more information and answer questions you may have about the medicine.  If you have questions or concerns that the pharmacist cannot address, please call or return to the Emergency Department.   Remember that you can always come back to the Emergency Department if you are not able to see your regular provider in the amount of time listed above, if you get any new symptoms, or if there is anything that worries you.

## 2024-10-17 NOTE — PHARMACY-ADMISSION MEDICATION HISTORY
Pharmacist Admission Medication History    Admission medication history is complete. The information provided in this note is only as accurate as the sources available at the time of the update.    Information Source(s): Patient, Hospital records, and CareEverywhere/SureScripts via in-person    Pertinent Information: None    Changes made to PTA medication list:  Added: None  Deleted: None  Changed: None      Medication History Completed By: Ariel Cervantes RPH 10/17/2024 11:43 AM    No outpatient medications have been marked as taking for the 10/17/24 encounter (Hospital Encounter).

## 2025-04-29 ENCOUNTER — HOSPITAL ENCOUNTER (EMERGENCY)
Facility: CLINIC | Age: 25
Discharge: HOME | End: 2025-04-29
Attending: EMERGENCY MEDICINE | Admitting: EMERGENCY MEDICINE

## 2025-04-29 VITALS
DIASTOLIC BLOOD PRESSURE: 82 MMHG | SYSTOLIC BLOOD PRESSURE: 123 MMHG | RESPIRATION RATE: 17 BRPM | OXYGEN SATURATION: 93 % | TEMPERATURE: 98.2 F | HEART RATE: 62 BPM

## 2025-04-29 DIAGNOSIS — K04.7 PERIAPICAL ABSCESS: ICD-10-CM

## 2025-04-29 PROCEDURE — 250N000011 HC RX IP 250 OP 636: Mod: JZ | Performed by: EMERGENCY MEDICINE

## 2025-04-29 PROCEDURE — 99284 EMERGENCY DEPT VISIT MOD MDM: CPT

## 2025-04-29 PROCEDURE — 96372 THER/PROPH/DIAG INJ SC/IM: CPT | Performed by: EMERGENCY MEDICINE

## 2025-04-29 RX ORDER — KETOROLAC TROMETHAMINE 10 MG/1
10 TABLET, FILM COATED ORAL EVERY 6 HOURS PRN
Qty: 20 TABLET | Refills: 0 | Status: SHIPPED | OUTPATIENT
Start: 2025-04-29

## 2025-04-29 RX ORDER — KETOROLAC TROMETHAMINE 15 MG/ML
15 INJECTION, SOLUTION INTRAMUSCULAR; INTRAVENOUS ONCE
Status: COMPLETED | OUTPATIENT
Start: 2025-04-29 | End: 2025-04-29

## 2025-04-29 RX ADMIN — KETOROLAC TROMETHAMINE 15 MG: 15 INJECTION, SOLUTION INTRAMUSCULAR; INTRAVENOUS at 19:45

## 2025-04-29 ASSESSMENT — COLUMBIA-SUICIDE SEVERITY RATING SCALE - C-SSRS
2. HAVE YOU ACTUALLY HAD ANY THOUGHTS OF KILLING YOURSELF IN THE PAST MONTH?: NO
1. IN THE PAST MONTH, HAVE YOU WISHED YOU WERE DEAD OR WISHED YOU COULD GO TO SLEEP AND NOT WAKE UP?: NO
6. HAVE YOU EVER DONE ANYTHING, STARTED TO DO ANYTHING, OR PREPARED TO DO ANYTHING TO END YOUR LIFE?: NO

## 2025-04-30 NOTE — ED PROVIDER NOTES
Emergency Department Note      History of Present Illness     Chief Complaint   Dental Pain      HPI   Malika Stokes is a 24 year old female who presents to the ED with dental pain. Patient reports left lower sided dental pain that has been swollen for 2 days. She started taking antibiotics but they did not help. She has a dentist appointment scheduled next week.      Independent Historian   None    Review of External Notes   Seen in the ER for apical abscess and facial cellulitis October of last year.    Past Medical History     Medical History and Problem List   The patient denies any significant past medical history.     Medications   The patient is not currently taking any prescribed medications.     Surgical History  Cholangiopancreatography   Irrigation and debridement abdomen washout        Physical Exam     Patient Vitals for the past 24 hrs:   BP Temp Pulse Resp SpO2   04/29/25 1923 123/82 98.2  F (36.8  C) 62 17 93 %     Physical Exam  Constitutional: Alert, attentive, GCS 15   HENT:    Mouth/Throat: Multiple dental caries. Necrotic partially fractured inferior posterior molar. No facial swelling. No gingival swelling  Eyes: EOM are normal, anicteric, conjugate gaze  CV: distal extremities warm, well perfused  Chest: Non-labored breathing on RA  Neurological: Alert, attentive, moving all extremities equally.   Skin: Skin is warm and dry.        ED Course      Medications Administered   Medications   ketorolac (TORADOL) injection 15 mg (has no administration in time range)       Procedures   Procedures     Discussion of Management   None    ED Course   ED Course as of 04/29/25 1945 Tue Apr 29, 2025 1933 I obtained history and examined the patient as noted above.        Additional Documentation  None    Medical Decision Making / Diagnosis     CMS Diagnoses: None    MIPS       None    MDM   Malika Stokes is a 24 year old female without significant past medical history presenting for  evaluation of 2 days of left posterior inferior dental pain without associated fever or swelling.  On exam, she has partially necrotic, fractured tooth with significant tenderness to percussion but no overt swelling or drainable abscess.  Presentation is consistent with apical abscess likely secondary to dental caries.  I recommended continuation of Tylenol, ibuprofen, alternating heat and ice packs and we will place her on 7 days of Augmentin.  She does have follow-up with dentistry already arranged for next week, return precautions were reviewed including worsening swelling, high fever, difficulty breathing or difficulty swallowing.  I did review with her that pain medications are not dispensed in the setting of dental pain due to MIPS.    Disposition   The patient was discharged.     Diagnosis     ICD-10-CM    1. Periapical abscess  K04.7            Discharge Medications   New Prescriptions    AMOXICILLIN-CLAVULANATE (AUGMENTIN) 875-125 MG TABLET    Take 1 tablet by mouth 2 times daily.    KETOROLAC (TORADOL) 10 MG TABLET    Take 1 tablet (10 mg) by mouth every 6 hours as needed for moderate pain.     Rory Au MD  [employer]  7:45 PM 04/29/25       Scribe Disclosure:  I, Jacek Pastor, am serving as a scribe at 7:35 PM on 4/29/2025 to document services personally performed by Rory Au MD based on my observations and the provider's statements to me.        Rory Au MD  04/29/25 1945

## 2025-04-30 NOTE — ED NOTES
Pt to D/C to home.  Pt provided with d/c instructions, including new medications, when medications were last given, and when to take them again.  Pt also informed to f/u with Dentist.  Pt verbalized understanding of all d/c and f/u instructions.  All questions were answered at this time.  Copy of paperwork sent with pt.

## 2025-04-30 NOTE — DISCHARGE INSTRUCTIONS
I recommend 1000 g of Tylenol every 6 hours for your pain, you can take either 600 ibuprofen or 10 mg ketorolac every 6 hours as well for the pain.  I recommend.  You should return to emergency department should you develop worsening swelling, high fever redness on face, difficulty swallowing or breathing.  Otherwise, we should plan to follow-up with dentistry as soon as you are able.

## 2025-04-30 NOTE — ED TRIAGE NOTES
Recurrent Left sided toothache for the last 2-3 days. Hx of dental abscess on the same side and it feels the same.     Triage Assessment (Adult)       Row Name 04/29/25 1924          Triage Assessment    Airway WDL WDL        Respiratory WDL    Respiratory WDL WDL        Skin Circulation/Temperature WDL    Skin Circulation/Temperature WDL WDL        Cardiac WDL    Cardiac WDL WDL        Peripheral/Neurovascular WDL    Peripheral Neurovascular WDL WDL        Cognitive/Neuro/Behavioral WDL    Cognitive/Neuro/Behavioral WDL WDL

## 2025-06-06 NOTE — ANESTHESIA POSTPROCEDURE EVALUATION
Patient: Malika Stokes    Procedure: Procedure(s):  ENDOSCOPIC RETROGRADE CHOLANGIOPANCREATOGRAPHY, WITH BILIARY AND PANCREATIC STENT INSERTION. BILIARY SPHINCTEROTOMY.       Diagnosis:Bile leak [K83.9]  Diagnosis Additional Information: No value filed.    Anesthesia Type:  General    Note:  Disposition: Inpatient   Postop Pain Control: Uneventful            Sign Out: Well controlled pain   PONV: No   Neuro/Psych: Uneventful            Sign Out: Acceptable/Baseline neuro status   Airway/Respiratory: Uneventful            Sign Out: Acceptable/Baseline resp. status   CV/Hemodynamics: Uneventful            Sign Out: Acceptable CV status   Other NRE: NONE   DID A NON-ROUTINE EVENT OCCUR? No           Last vitals:  Vitals Value Taken Time   /83 11/16/21 1400   Temp 36.7  C (98.06  F) 11/16/21 1412   Pulse 94 11/16/21 1412   Resp 21 11/16/21 1412   SpO2 99 % 11/16/21 1412   Vitals shown include unvalidated device data.    Electronically Signed By: Roma Quintanilla MD  November 16, 2021  2:13 PM  
Former smoker

## (undated) DEVICE — INTR ENDOSCOPIC STENT FUSION OASIS 09.0FRX200CM

## (undated) DEVICE — ENDO TROCAR FIRST ENTRY KII FIOS Z-THRD 05X100MM CTF03

## (undated) DEVICE — ENDO TROCAR SLEEVE KII Z-THREADED 05X100MM CTS02

## (undated) DEVICE — SUCTION IRR STRYKERFLOW II W/TIP 250-070-520

## (undated) DEVICE — GLOVE PROTEXIS BLUE W/NEU-THERA 8.0  2D73EB80

## (undated) DEVICE — SU MONOCRYL 4-0 PS-2 27" UND Y426H

## (undated) DEVICE — DRAIN JACKSON PRATT ROUND SIL 19FR W/TROCAR LF JP-2232

## (undated) DEVICE — ENDO TUBING CO2 SMARTCAP STERILE DISP 100145CO2EXT

## (undated) DEVICE — GUIDEWIRE NOVAGOLD .018X260CM STR TIP M00552000

## (undated) DEVICE — DRSG PRIMAPORE 02X3" 7133

## (undated) DEVICE — LINEN TOWEL PACK X5 5464

## (undated) DEVICE — SOL WATER IRRIG 1000ML BOTTLE 2F7114

## (undated) DEVICE — PREP CHLORAPREP 26ML TINTED ORANGE  260815

## (undated) DEVICE — SUCTION MANIFOLD NEPTUNE 2 SYS 4 PORT 0702-020-000

## (undated) DEVICE — KIT CONNECTOR FOR OLYMPUS ENDOSCOPES DEFENDO 100310

## (undated) DEVICE — BALLOON EXTRACTION 15X1950MM 3.2MM TL B-V243Q-A

## (undated) DEVICE — GLOVE PROTEXIS W/NEU-THERA 7.5  2D73TE75

## (undated) DEVICE — PACK ENDOSCOPY GI CUSTOM UMMC

## (undated) DEVICE — NDL 30GA 0.5" 305106

## (undated) DEVICE — SU ETHILON 3-0 PS-1 18" 1663H

## (undated) DEVICE — ENDO BITE BLOCK ADULT OMNI-BLOC

## (undated) DEVICE — ESU GROUND PAD ADULT W/CORD E7507

## (undated) DEVICE — NDL INSUFFLATION 13GA 120MM C2201

## (undated) DEVICE — KIT ENDO FIRST STEP DISINFECTANT 200ML W/POUCH EP-4

## (undated) DEVICE — ENDO FUSION OMNI-TOME 21 FS-OMNI-21 G48675

## (undated) DEVICE — LINEN TOWEL PACK X6 WHITE 5487

## (undated) DEVICE — ANTIFOG SOLUTION W/FOAM PAD 31142527

## (undated) DEVICE — Device

## (undated) DEVICE — DRAIN JACKSON PRATT RESERVOIR 100ML SU130-1305

## (undated) RX ORDER — ALBUTEROL SULFATE 90 UG/1
AEROSOL, METERED RESPIRATORY (INHALATION)
Status: DISPENSED
Start: 2021-11-16

## (undated) RX ORDER — ONDANSETRON 2 MG/ML
INJECTION INTRAMUSCULAR; INTRAVENOUS
Status: DISPENSED
Start: 2021-11-16

## (undated) RX ORDER — PROPOFOL 10 MG/ML
INJECTION, EMULSION INTRAVENOUS
Status: DISPENSED
Start: 2021-11-16

## (undated) RX ORDER — SODIUM CHLORIDE, SODIUM LACTATE, POTASSIUM CHLORIDE, CALCIUM CHLORIDE 600; 310; 30; 20 MG/100ML; MG/100ML; MG/100ML; MG/100ML
INJECTION, SOLUTION INTRAVENOUS
Status: DISPENSED
Start: 2021-11-13

## (undated) RX ORDER — DEXAMETHASONE SODIUM PHOSPHATE 4 MG/ML
INJECTION, SOLUTION INTRA-ARTICULAR; INTRALESIONAL; INTRAMUSCULAR; INTRAVENOUS; SOFT TISSUE
Status: DISPENSED
Start: 2021-11-13

## (undated) RX ORDER — LIDOCAINE HYDROCHLORIDE 20 MG/ML
INJECTION, SOLUTION EPIDURAL; INFILTRATION; INTRACAUDAL; PERINEURAL
Status: DISPENSED
Start: 2021-11-13

## (undated) RX ORDER — FENTANYL CITRATE 50 UG/ML
INJECTION, SOLUTION INTRAMUSCULAR; INTRAVENOUS
Status: DISPENSED
Start: 2021-11-13

## (undated) RX ORDER — PROPOFOL 10 MG/ML
INJECTION, EMULSION INTRAVENOUS
Status: DISPENSED
Start: 2021-11-13

## (undated) RX ORDER — FENTANYL CITRATE 50 UG/ML
INJECTION, SOLUTION INTRAMUSCULAR; INTRAVENOUS
Status: DISPENSED
Start: 2021-11-16

## (undated) RX ORDER — PIPERACILLIN SODIUM, TAZOBACTAM SODIUM 3; .375 G/15ML; G/15ML
INJECTION, POWDER, LYOPHILIZED, FOR SOLUTION INTRAVENOUS
Status: DISPENSED
Start: 2021-11-13

## (undated) RX ORDER — ROCURONIUM BROMIDE 50 MG/5 ML
SYRINGE (ML) INTRAVENOUS
Status: DISPENSED
Start: 2021-11-13

## (undated) RX ORDER — LIDOCAINE HYDROCHLORIDE 20 MG/ML
INJECTION, SOLUTION EPIDURAL; INFILTRATION; INTRACAUDAL; PERINEURAL
Status: DISPENSED
Start: 2021-11-16

## (undated) RX ORDER — ACETAMINOPHEN 325 MG/1
TABLET ORAL
Status: DISPENSED
Start: 2021-11-13

## (undated) RX ORDER — GABAPENTIN 300 MG/1
CAPSULE ORAL
Status: DISPENSED
Start: 2021-11-13

## (undated) RX ORDER — EPHEDRINE SULFATE 50 MG/ML
INJECTION, SOLUTION INTRAMUSCULAR; INTRAVENOUS; SUBCUTANEOUS
Status: DISPENSED
Start: 2021-11-13

## (undated) RX ORDER — ONDANSETRON 2 MG/ML
INJECTION INTRAMUSCULAR; INTRAVENOUS
Status: DISPENSED
Start: 2021-11-13

## (undated) RX ORDER — ROCURONIUM BROMIDE 50 MG/5 ML
SYRINGE (ML) INTRAVENOUS
Status: DISPENSED
Start: 2021-11-16

## (undated) RX ORDER — ALBUTEROL SULFATE 0.83 MG/ML
SOLUTION RESPIRATORY (INHALATION)
Status: DISPENSED
Start: 2021-11-16

## (undated) RX ORDER — FENTANYL CITRATE-0.9 % NACL/PF 10 MCG/ML
PLASTIC BAG, INJECTION (ML) INTRAVENOUS
Status: DISPENSED
Start: 2021-11-13